# Patient Record
Sex: MALE | Race: WHITE | NOT HISPANIC OR LATINO | Employment: OTHER | URBAN - METROPOLITAN AREA
[De-identification: names, ages, dates, MRNs, and addresses within clinical notes are randomized per-mention and may not be internally consistent; named-entity substitution may affect disease eponyms.]

---

## 2017-06-06 ENCOUNTER — TELEPHONE (OUTPATIENT)
Dept: INTERNAL MEDICINE | Facility: CLINIC | Age: 47
End: 2017-06-06

## 2017-06-06 DIAGNOSIS — Z00.00 ANNUAL PHYSICAL EXAM: Primary | ICD-10-CM

## 2017-06-06 NOTE — TELEPHONE ENCOUNTER
----- Message from Bowen Wheeler sent at 6/6/2017 12:17 PM CDT -----  Contact: Self [phone]  Doctor appointment and lab have been scheduled.  Please link lab orders to the lab appointment.  Date of doctor appointment:    Physical or EP:  06/30  Date of lab appointment:  06/23  Comments:

## 2017-06-23 ENCOUNTER — LAB VISIT (OUTPATIENT)
Dept: LAB | Facility: HOSPITAL | Age: 47
End: 2017-06-23
Attending: FAMILY MEDICINE
Payer: COMMERCIAL

## 2017-06-23 DIAGNOSIS — Z00.00 ANNUAL PHYSICAL EXAM: ICD-10-CM

## 2017-06-23 LAB
ALBUMIN SERPL BCP-MCNC: 3.8 G/DL
ALP SERPL-CCNC: 82 U/L
ALT SERPL W/O P-5'-P-CCNC: 10 U/L
ANION GAP SERPL CALC-SCNC: 7 MMOL/L
AST SERPL-CCNC: 15 U/L
BILIRUB SERPL-MCNC: 1.3 MG/DL
BUN SERPL-MCNC: 13 MG/DL
CALCIUM SERPL-MCNC: 9 MG/DL
CHLORIDE SERPL-SCNC: 108 MMOL/L
CHOLEST/HDLC SERPL: 4.8 {RATIO}
CO2 SERPL-SCNC: 26 MMOL/L
CREAT SERPL-MCNC: 0.9 MG/DL
ERYTHROCYTE [DISTWIDTH] IN BLOOD BY AUTOMATED COUNT: 12.7 %
EST. GFR  (AFRICAN AMERICAN): >60 ML/MIN/1.73 M^2
EST. GFR  (NON AFRICAN AMERICAN): >60 ML/MIN/1.73 M^2
GLUCOSE SERPL-MCNC: 98 MG/DL
HCT VFR BLD AUTO: 43.6 %
HDL/CHOLESTEROL RATIO: 21 %
HDLC SERPL-MCNC: 143 MG/DL
HDLC SERPL-MCNC: 30 MG/DL
HGB BLD-MCNC: 15 G/DL
LDLC SERPL CALC-MCNC: 90.8 MG/DL
MCH RBC QN AUTO: 29.1 PG
MCHC RBC AUTO-ENTMCNC: 34.4 %
MCV RBC AUTO: 85 FL
NONHDLC SERPL-MCNC: 113 MG/DL
PLATELET # BLD AUTO: 148 K/UL
PMV BLD AUTO: 12.3 FL
POTASSIUM SERPL-SCNC: 4 MMOL/L
PROT SERPL-MCNC: 7 G/DL
RBC # BLD AUTO: 5.15 M/UL
SODIUM SERPL-SCNC: 141 MMOL/L
TRIGL SERPL-MCNC: 111 MG/DL
WBC # BLD AUTO: 4.5 K/UL

## 2017-06-23 PROCEDURE — 80053 COMPREHEN METABOLIC PANEL: CPT

## 2017-06-23 PROCEDURE — 85027 COMPLETE CBC AUTOMATED: CPT

## 2017-06-23 PROCEDURE — 80061 LIPID PANEL: CPT

## 2017-06-23 PROCEDURE — 36415 COLL VENOUS BLD VENIPUNCTURE: CPT

## 2017-06-30 ENCOUNTER — OFFICE VISIT (OUTPATIENT)
Dept: INTERNAL MEDICINE | Facility: CLINIC | Age: 47
End: 2017-06-30
Attending: FAMILY MEDICINE
Payer: COMMERCIAL

## 2017-06-30 VITALS
HEART RATE: 75 BPM | WEIGHT: 184.06 LBS | SYSTOLIC BLOOD PRESSURE: 90 MMHG | OXYGEN SATURATION: 98 % | HEIGHT: 70 IN | DIASTOLIC BLOOD PRESSURE: 60 MMHG | BODY MASS INDEX: 26.35 KG/M2

## 2017-06-30 DIAGNOSIS — N20.0 KIDNEY STONE: ICD-10-CM

## 2017-06-30 DIAGNOSIS — Z00.00 ANNUAL PHYSICAL EXAM: Primary | ICD-10-CM

## 2017-06-30 DIAGNOSIS — D69.6 THROMBOCYTOPENIA: ICD-10-CM

## 2017-06-30 PROCEDURE — 99396 PREV VISIT EST AGE 40-64: CPT | Mod: S$GLB,,, | Performed by: FAMILY MEDICINE

## 2017-06-30 PROCEDURE — 99999 PR PBB SHADOW E&M-EST. PATIENT-LVL III: CPT | Mod: PBBFAC,,, | Performed by: FAMILY MEDICINE

## 2017-06-30 NOTE — PROGRESS NOTES
Subjective:       Patient ID: Ja Hudson is a 47 y.o. male.    Chief Complaint: Annual Exam    Established patient for an annual wellness check/physical exam. No specific complaints, please see ROS.        Review of Systems   Constitutional: Negative for chills, fatigue and fever.   HENT: Negative for congestion and trouble swallowing.    Eyes: Negative for redness.   Respiratory: Negative for cough, chest tightness and shortness of breath.    Cardiovascular: Negative for chest pain, palpitations and leg swelling.   Gastrointestinal: Negative for abdominal pain and blood in stool.   Genitourinary: Negative for hematuria.   Musculoskeletal: Negative for arthralgias, back pain, gait problem, joint swelling, myalgias and neck pain.   Skin: Negative for color change and rash.   Neurological: Negative for tremors, speech difficulty, weakness, numbness and headaches.   Hematological: Negative for adenopathy. Does not bruise/bleed easily.   Psychiatric/Behavioral: Negative for behavioral problems, confusion and sleep disturbance. The patient is not nervous/anxious.        Objective:      Physical Exam   Constitutional: He is oriented to person, place, and time. He appears well-developed and well-nourished. No distress.   HENT:   Head: Normocephalic.   Right Ear: Tympanic membrane, external ear and ear canal normal.   Left Ear: Tympanic membrane, external ear and ear canal normal.   Nose: Nose normal.   Mouth/Throat: Uvula is midline, oropharynx is clear and moist and mucous membranes are normal. No oropharyngeal exudate.   Eyes: Conjunctivae are normal. Pupils are equal, round, and reactive to light. Right eye exhibits no discharge. Left eye exhibits no discharge. No scleral icterus.   Neck: Normal range of motion. Neck supple. Carotid bruit is not present. No thyromegaly present.   Cardiovascular: Normal rate, regular rhythm, normal heart sounds and intact distal pulses.  Exam reveals no gallop and no friction  rub.    No murmur heard.  Pulmonary/Chest: Effort normal and breath sounds normal. No respiratory distress. He has no wheezes. He has no rales. He exhibits no tenderness.   Abdominal: Soft. Bowel sounds are normal. He exhibits no distension and no mass. There is no tenderness. There is no rebound and no guarding.   Musculoskeletal: Normal range of motion. He exhibits no edema or tenderness.   Lymphadenopathy:     He has no cervical adenopathy.   Neurological: He is alert and oriented to person, place, and time. He has normal strength. He displays normal reflexes. No cranial nerve deficit or sensory deficit. Coordination and gait normal.   Skin: Skin is warm and dry. No rash noted. He is not diaphoretic.   Psychiatric: He has a normal mood and affect. His behavior is normal. Judgment and thought content normal.   Nursing note and vitals reviewed.      Assessment:       1. Annual physical exam    2. Thrombocytopenia    3. Kidney stone        Plan:   Ja was seen today for annual exam.    Diagnoses and all orders for this visit:    Annual physical exam    Thrombocytopenia  Comments:  very mild, had CT in ER recently, no HSM noted, no hx of HO disease    Kidney stone  Comments:  no sx at this time  Orders:  -     Ambulatory referral to Urology      See meds, orders, follow up, routing and instructions sections of encounter.  Dictation #1  MRN:[id number]  CSN:[id number]

## 2018-10-19 DIAGNOSIS — S52.501D CLOSED FRACTURE OF LOWER END OF RIGHT RADIUS WITH ROUTINE HEALING: Primary | ICD-10-CM

## 2018-10-23 ENCOUNTER — CLINICAL SUPPORT (OUTPATIENT)
Dept: REHABILITATION | Facility: HOSPITAL | Age: 48
End: 2018-10-23
Payer: COMMERCIAL

## 2018-10-23 DIAGNOSIS — R53.1 WEAKNESS: ICD-10-CM

## 2018-10-23 DIAGNOSIS — M25.532 WRIST PAIN, ACUTE, LEFT: ICD-10-CM

## 2018-10-23 PROCEDURE — 97022 WHIRLPOOL THERAPY: CPT | Mod: PO

## 2018-10-23 PROCEDURE — 97165 OT EVAL LOW COMPLEX 30 MIN: CPT | Mod: PO

## 2018-10-23 NOTE — PROGRESS NOTES
[facility] Occupational Therapy  Initial Evaluation     Evaluation Date: 10/23/2018  Patient: Ja Hudson  YOB: 1970  Chart Number: [id number]  Referring Physician: Jeff Foster MD  Medical Diagnosis: S52.501D (ICD-10-CM) - Closed fracture of lower end of right radius with routine healing  Therapy Diagnosis:   1. Wrist pain, acute, left     2. Weakness         Authorization Period: Oct. 19,2018 to Oct.19,2019  Date of Return to MD:  Thursday; Nov.1, 2018    Visit #: 1 of 1  Time In: 4  Time Out: 5  Total Billable Time:  60     Precautions:  Standard    Subjective     Involved Side: right wrist pain   Dominant Side: left   Date of Onset: August 24, 2018   Mechanism of Injury: FOOSH during a game  History of Current Condition:N/A  Surgical Procedure:  Surgery on August 25, 2018  Previous Therapy: [facility] [place], which odd not help    Patient's Goals for Therapy:  To increase motion to return to normal ADLs and IADLs , decrease pain    Pain:  Functional Pain Scale Rating 0-10:   stiffness/10 with use  0/10 without use   0/10 with sleep     Location: stiffness in wrist  Easing Factors: rest    Functional Limitations/Social History:    Previous functional status includes: Independent with all ADLs.     Current FunctionalStatus   Home/Living environment : lives with their family      Limitation of Functional Status as follows:   ADLs/IADLs:     - Feeding: I    - Bathing: I    - Dressing: I    - Grooming: I    - Driving: I        Occupation:     Working presently: employed  Duties: weights    Past Medical History/Physical Systems Review:   Past Medical History:   Diagnosis Date    Kidney stone      Ja Hudson  has a past surgical history that includes Appendectomy and Knee surgery.    Ja currently has no medications in their medication list.    Review of patient's allergies indicates:   Allergen Reactions    Keflex  [cephalexin] Swelling    Statins-hmg-coa reductase inhibitors Swelling          Objective     Mental status: alert    Observation:   Skin intact    Sensation: Median Nerve Distribution   10/23/2018 10/23/2018    Left Right   Monofilament Testing     Normal 1.65-2.83 Present Present   Diminished Light Touch 3.22-3.61 Present Present   Diminished Protective 3.84-4.31 Present Present   Loss of Protective 4.56-6.65 Present Present   Untestable >6.65 Present Present       Wound Assessment:   Size: 7 cm     Edema: Circumferential measurements: In centimters        AROM: WNL in all digits      Wrist ROM: Right  Active  fisted 10/23/2018   Extension 43   Flexion 36   Radial Deviation 15   Ulnar Deviation 20   Supination 70   Pronation wnl          Strength: (SHIVA Dynamometer in lbs.) Average 3 trials, Position II:     10/23/2018 10/23/2018   Rung 2 Left Right   SHIVA # 1 85.6 # 57.6#       Pinch Strength (Measured in psi)     10/23/2018 10/23/2018    Left Right   Key Pinch 23 psi 15psi   3pt Pinch 18psi 10 psi   2pt Pinch 10 psi 6 psi       Outcome Measure: FOTO    Category: Self Care      Current Score  = 41% impaired, CK = at least 40% but < 60% impaired, limited or restricted  Goal at Discharge Score =  25% impaired CJ = at least 20% but < 40% impaired, limited or restricted    Score interpretation is as follows:   TEST SCORE  Modifier  Impairment Limitation Restriction    0%  CH  0 % impaired, limited or restricted    1-19%  CI  @ least 1% but less than 20% impaired, limited or restricted    20-39%  CJ  @ least 20%<40% impaired, limited or restricted    40-59%  CK  @ least 40%<60% impaired, limited or restricted    60-79%  CL  @ least 60% <80% impaired, limited or restricted    80-99%  CM  @ least 80%<100% impaired limited or restricted    100%  CN  100% impaired, limited or restricted             Treatment     Treatment Time In/out  (separate from total time) : 10 minutes at the end of the hour       Issued HEP  and educated on modality use for pain management (see patient instructions). Pt verbally understood the instructions as they were given and demonstrated proper form and technique during therapy. Pt was advise to perform these exercises free of pain, and to stop performing them if pain occurs.    Patient received fluidotherapy to right  hand(s) for 12 minutes to increase blood flow, circulation, desensitization, sensory re-education and for pain management x 2 minutes for each wrist motions    Applied scar pad to hypertrophic scar post scar massage with stick.   See PI in note section for detail HEP    Patient/Family Education: role of OT, goals for OT, scheduling/cancellations - pt verbalized understanding. Discussed insurance limitations with patient.      Assessment       Anticipated barriers to occupational therapy:   Pt has no cultural, educational or language barriers to learning provided.    Profile and History Assessment of Occupational Performance Level of Clinical Decision Making Complexity Score   Occupational Profile:   Ja Hudson is a 48 y.o. male who lives with their family and is currently employed as  . Ja Hudson has difficulty with  working out   affecting his/her daily functional abilities. His/her main goal for therapy is to increase motion .     Comorbidities:   n/a    Medical and Therapy History Review:   Brief               Performance Deficits    Physical:  Joint Mobility  Joint Stability  Muscle Endurance  Edema   Strength  Pinch Strength  Pain    Cognitive:  No Deficits    Psychosocial:    No Deficits     Clinical Decision Making:  high    Assessment Process:  Comprehensive Assessments    Modification/Need for Assistance:  Significant Modifications/Assistance    Intervention Selection:  Multiple Treatment Options       low  Based on PMHX, co morbidities , data from assessments and functional level of assistance required with task and clinical  presentation directly impacting function.     Assessment  This 48 y.o. male referred to Outpatient Occupational Therapy with diagnosis of   Encounter Diagnoses   Name Primary?    Wrist pain, acute, left     Weakness     presents with limitations as described in problem list. Patient can benefit from Occupational Therapy services for Ultrasound, moist heat, PROM, AAROM, AROM, Theraputic exercises, home exercise program provied with written instructions and strengthening. The following goals were discussed with the patient and he is in agreement with them as to be addressed in the treatment plan.     Problem List:   Decreased function of Right UE, Decreased ROM, Increased pain, Decreased strength, Hypomobility, Inability to perform work/tasks and Inability to perform leisure activiites      Goals:     Short Term (6-8  weeks ) :  Goals to be met in 6-8  weeks:    1) Patient to be IND with HEP and modalities for pain managment.  2) Patient will  Increase AROM 15-20 degrees in hand/wrist to increase functional hand use for ADLs/work/leisure activities.  3)Pt will increase  strength 10-20 lbs. to improve functional grasp for ADLs/work/leisure activities.  4) Pt will increase pinch strength in all 3 limited positions by 1-3 psi to assist with manipulation and fine motor task        Plan     Plan  Ja to be treated by Occupational Therapy 1-2  times per week for 60 days during the certification period from   10-23-18  to 12-23-18  to achieve the established goals.         Laura Rubio, GINA,  OTR/L, CHT  Occupational therapist, Certified Hand Therapist

## 2018-10-23 NOTE — PATIENT INSTRUCTIONS
AROM: Wrist Extension        With right palm down, bend wrist up.  Repeat __15__ times per set.  Do __5__ sessions per day.    Copyright © [employer]. All rights reserved.   AROM: Wrist Flexion        With right palm up, bend wrist up.  Repeat __15__ times per set. Do _5___ sessions per day.    Copyright © [employer]. All rights reserved.   AROM: Wrist Radial / Ulnar Deviation Against Gravity        With thumb toward face, gently bend left wrist toward body, then away. Keep elbow bent and supported.  Repeat __15__ times per set.  Do __5__ sessions per day.  Copyright © [employer]. All rights reserved.     Forearm Supinators        Child sits with back straight, left elbow against side and bent at 90°, thumb up.  Resist while child attempts to turn palm up.  Do not let elbow straighten or move away from body.  Hold __3__ seconds. Repeat __15__ times. Do __5__ sessions per day.  CAUTION: Pressure should be slow and steady.    Copyright © [employer]. All rights reserved.

## 2018-11-02 ENCOUNTER — CLINICAL SUPPORT (OUTPATIENT)
Dept: REHABILITATION | Facility: HOSPITAL | Age: 48
End: 2018-11-02
Payer: COMMERCIAL

## 2018-11-02 DIAGNOSIS — M25.532 WRIST PAIN, ACUTE, LEFT: ICD-10-CM

## 2018-11-02 DIAGNOSIS — R53.1 WEAKNESS: ICD-10-CM

## 2018-11-02 PROCEDURE — 97022 WHIRLPOOL THERAPY: CPT | Mod: PO

## 2018-11-02 PROCEDURE — 97018 PARAFFIN BATH THERAPY: CPT | Mod: PO

## 2018-11-02 PROCEDURE — 97110 THERAPEUTIC EXERCISES: CPT | Mod: PO

## 2018-11-02 NOTE — PROGRESS NOTES
Occupational Therapy Daily Treatment Note     Name: Ja Aguilar [facility]  Clinic Number: [id number]    Therapy Diagnosis:   Encounter Diagnoses   Name Primary?    Wrist pain, acute, left     Weakness      Physician: Jeff Foster MD      Surgical Procedure:  Surgery on August 25, 2018  Visit # / Visits authorized:  2 of 20   Date of Return to MD:  Nov.29, Thursday after Thanksgiving       Time In: 1  Time Out: 2    Total Billable Time: 60  minutes    Precautions: Standard    Subjective     Pt reports: he was compliant with home exercise program given last session.   Response to previous treatment: has been compliant  with HEP and using scar pad      Pain: 0/10  Location: stiffness in wrist     Objective     Sarbjit received the following supervised modalities after being cleared for contradictions for 8 minutes:   -  Patient received paraffin bath to left  hand(s) for 8 minutes to increase blood flow, circulation, pain management and for tissue elasticity prior to therex.     Sarbjit received the following manual therapy techniques for 8  minutes:     -Performed scar massage to right volar wrist  area for 8 minutes with hard stick  to decrease adhesions and improve tensile glide.      Sarbjit participated in dynamic functional therapeutic exercises to improve functional performance for 30  Minutes. Patient received fluidotherapy to right  hand(s) for 80 minutes to increase blood flow, circulation, desensitization, sensory re-education and for pain management for 2 minutes with each exercises.   - wrist PRE with 1#wt   - yellow power web with 3 minutes  for finger extension  - gripping soft ball   -blue  pinch with lateral and 3 jaw   - sup with wheel with yellow putty     Active  fisted 10/23/2018   Extension 55(+12)   Flexion 47 (+13)   Radial Deviation 15   Ulnar Deviation 25 (+5)   Supination 81 (+11)   Pronation wnl           Home Exercises and Education Provided       Written Home Exercises  Provided: to add 1#wt with HEP. Supplied red  Putty .   Exercises were reviewed and Sarbjit was able to demonstrate them prior to the end of the session.  Sarbjit demonstrated good  understanding of the education provided.     Pt received a written copy of exercises to perform at home.   See EMR under patient instructions for exercises given.     Sarbjit demonstrated good  understanding of the education provided.       Assessment     Pt would continue to benefit from skilled OT. Pt reports feeling a pinch with supination between  Distal radius and ulnar ( ONESIMO joint). Pt reports healing fracture and doctor reported using not more than 1-2#wt . HEP to upgrade to 1 pound weight at home . Supplied red putty for every other day for gripping and pinching.       Sarbjit is progressing well towards his goals and there are no updates to goals at this time. Pt prognosis is Good.    Pt continues to be limited in functional and leisurely pursuits. Pain limits patients participation in ADL's. Pt is not able to carryout necessary vocational tasks. Patient continues to requires cues and skilled supervision to complete HEP     Pt will continue to benefit from skilled outpatient occupational therapy to address the deficits listed in the problem list on initial evaluation provide pt/family education and to maximize pt's level of independence in the home and community environment.     Anticipated barriers to occupational therapy: n/a    Pt's spiritual, cultural and educational needs considered and pt agreeable to plan of care and goals.    Goals:  Cont goals from POC    Plan   Continue 1-2x week x 6-8  weeks during the certification period from 10-23-18  to 12-23-18  in pursuit of OT goals.    Discussed Plan of Care with patient: Yes  Updates/Grading for next session: added 1#wt and red putty       Laura Rubio, OT , CHT

## 2018-11-08 ENCOUNTER — CLINICAL SUPPORT (OUTPATIENT)
Dept: REHABILITATION | Facility: HOSPITAL | Age: 48
End: 2018-11-08
Payer: COMMERCIAL

## 2018-11-08 DIAGNOSIS — M25.532 WRIST PAIN, ACUTE, LEFT: ICD-10-CM

## 2018-11-08 DIAGNOSIS — R53.1 WEAKNESS: ICD-10-CM

## 2018-11-08 PROCEDURE — 97110 THERAPEUTIC EXERCISES: CPT | Mod: PO

## 2018-11-08 PROCEDURE — 97022 WHIRLPOOL THERAPY: CPT | Mod: PO,59

## 2018-11-08 PROCEDURE — 97018 PARAFFIN BATH THERAPY: CPT | Mod: PO

## 2018-11-08 NOTE — PROGRESS NOTES
Occupational Therapy Daily Treatment Note     Name: Ja Aguilar [facility]  Clinic Number: [id number]    Therapy Diagnosis:   Encounter Diagnoses   Name Primary?    Wrist pain, acute, left     Weakness      Physician: Jeff Foster MD      Surgical Procedure:  Surgery on August 25, 2018  Visit # / Visits authorized:  3 of 20   Date of Return to MD:  Nov.29, Thursday after Thanksgiving       Time In: 1  Time Out: 2    Total Billable Time: 60  minutes    Precautions: Standard    Subjective     Pt reports: he was compliant with home exercise program given last session.   Response to previous treatment: has been compliant  with HEP and using scar pad      Pain: 0/10  Location: stiffness in wrist     Objective     Sarbjit received the following supervised modalities after being cleared for contradictions for 8 minutes:   -  Patient received paraffin bath to left  hand(s) for 8 minutes to increase blood flow, circulation, pain management and for tissue elasticity prior to therex.     Sarbjit received the following manual therapy techniques for 8  minutes:     -Performed scar massage to right volar wrist  area for 8 minutes with hard stick  to decrease adhesions and improve tensile glide.      Sarbjit participated in dynamic functional therapeutic exercises to improve functional performance for 30  Minutes. Patient received fluidotherapy to right  hand(s) for 80 minutes to increase blood flow, circulation, desensitization, sensory re-education and for pain management for 2 minutes with each exercises.   - wrist PRE with 1#wt   - yellow power web with 3 minutes  for finger extension  - gripping soft ball   -blue  pinch with lateral and 3 jaw   - sup with wheel with yellow putty   -  Putty ciser #5 with yellow putty for UD    Active  fisted 11/8/2018   Extension 60    Flexion 49   Radial Deviation 15   Ulnar Deviation 26   Supination 77   Pronation wnl           Home Exercises and Education Provided        Written Home Exercises Provided: to add 1#wt with HEP. Supplied red  Putty .   Exercises were reviewed and Sarbjit was able to demonstrate them prior to the end of the session.  Sarbjit demonstrated good  understanding of the education provided.     Pt received a written copy of exercises to perform at home.   See EMR under patient instructions for exercises given.     Sarbjit demonstrated good  understanding of the education provided.       Assessment     Pt would continue to benefit from skilled OT. Pt reports feeling a pinch with supination between  Distal radius and ulnar ( ONESIMO joint). Pt reports healing fracture and doctor reported using not more than 1-2#wt . HEP to upgrade to 1 pound weight at home . Supplied red putty for every other day for gripping and pinching.       Sarbjit is progressing well towards his goals and there are no updates to goals at this time. Pt prognosis is Good.    Pt continues to be limited in functional and leisurely pursuits. Pain limits patients participation in ADL's. Pt is not able to carryout necessary vocational tasks. Patient continues to requires cues and skilled supervision to complete HEP     Pt will continue to benefit from skilled outpatient occupational therapy to address the deficits listed in the problem list on initial evaluation provide pt/family education and to maximize pt's level of independence in the home and community environment.     Anticipated barriers to occupational therapy: n/a    Pt's spiritual, cultural and educational needs considered and pt agreeable to plan of care and goals.    Goals:  Cont goals from POC    Plan   Continue 1-2x week x 6-8  weeks during the certification period from 10-23-18  to 12-23-18  in pursuit of OT goals.    Discussed Plan of Care with patient: Yes  Updates/Grading for next session: added 1#wt and red putty       Laura Rubio, OT , CHT

## 2018-11-16 ENCOUNTER — CLINICAL SUPPORT (OUTPATIENT)
Dept: REHABILITATION | Facility: HOSPITAL | Age: 48
End: 2018-11-16
Payer: COMMERCIAL

## 2018-11-16 DIAGNOSIS — R53.1 WEAKNESS: ICD-10-CM

## 2018-11-16 DIAGNOSIS — M25.532 WRIST PAIN, ACUTE, LEFT: ICD-10-CM

## 2018-11-16 PROCEDURE — 97110 THERAPEUTIC EXERCISES: CPT | Mod: PO

## 2018-11-16 PROCEDURE — 97018 PARAFFIN BATH THERAPY: CPT | Mod: PO

## 2018-11-16 PROCEDURE — 97022 WHIRLPOOL THERAPY: CPT | Mod: PO,59

## 2018-11-16 NOTE — PROGRESS NOTES
Occupational Therapy Daily Treatment Note     Name: Ja Aguilar [facility]  Clinic Number: [id number]    Therapy Diagnosis:   Encounter Diagnoses   Name Primary?    Wrist pain, acute, left     Weakness      Physician: Jeff Foster MD      Surgical Procedure:  Surgery on August 25, 2018  Visit # / Visits authorized:  4 of 20   Date of Return to MD:  Nov.29, Thursday after Thanksgiving       Time In: 1  Time Out: 2    Total Billable Time: 60  minutes    Precautions: Standard    Subjective     Pt reports: he was compliant with home exercise program given last session.   Response to previous treatment: has been compliant  with HEP and using scar pad      Pain: 0/10  Location: stiffness in wrist     Objective     Sarbjit received the following supervised modalities after being cleared for contradictions for 8 minutes:   -  Patient received paraffin bath to left  hand(s) for 8 minutes to increase blood flow, circulation, pain management and for tissue elasticity prior to therex.     Sarbjit received the following manual therapy techniques for 8  minutes:     -Performed scar massage to right volar wrist  area for 8 minutes with hard stick  to decrease adhesions and improve tensile glide.      Sarbjit participated in dynamic functional therapeutic exercises to improve functional performance for 30  Minutes. Patient received fluidotherapy to right  hand(s) for 80 minutes to increase blood flow, circulation, desensitization, sensory re-education and for pain management for 2 minutes with each exercises.   - wrist PRE with 2#wt   - yellow power web with 3 minutes  for finger extension  -  CHG with sup/pro   -blue  pinch with lateral and 3 jaw   - sup with wheel with yellow putty   -  Putty ciser #5 with yellow putty for UD    Active  fisted 11/8/2018   Extension 60    Flexion 49   Radial Deviation 15   Ulnar Deviation 26   Supination 77   Pronation wnl           Home Exercises and Education Provided        Written Home Exercises Provided: to add 1#wt with HEP. Supplied red  Putty .   Exercises were reviewed and Sarbjit was able to demonstrate them prior to the end of the session.  Sarbjit demonstrated good  understanding of the education provided.     Pt received a written copy of exercises to perform at home.   See EMR under patient instructions for exercises given.     Sarbjit demonstrated good  understanding of the education provided.       Assessment     Pt would continue to benefit from skilled OT. Pt tolerated 2#wt and started with 2# wt recently. Motion continues to improve.       Sarbjit is progressing well towards his goals and there are no updates to goals at this time. Pt prognosis is Good.    Pt continues to be limited in functional and leisurely pursuits. Pain limits patients participation in ADL's. Pt is not able to carryout necessary vocational tasks. Patient continues to requires cues and skilled supervision to complete HEP     Pt will continue to benefit from skilled outpatient occupational therapy to address the deficits listed in the problem list on initial evaluation provide pt/family education and to maximize pt's level of independence in the home and community environment.     Anticipated barriers to occupational therapy: n/a    Pt's spiritual, cultural and educational needs considered and pt agreeable to plan of care and goals.    Goals:  Cont goals from POC    Plan   Continue 1-2x week x 6-8  weeks during the certification period from 10-23-18  to 12-23-18  in pursuit of OT goals.    Discussed Plan of Care with patient: Yes  Updates/Grading for next session: added 1#wt and red putty       Laura Rubio, OT , CHT

## 2018-11-23 ENCOUNTER — CLINICAL SUPPORT (OUTPATIENT)
Dept: REHABILITATION | Facility: HOSPITAL | Age: 48
End: 2018-11-23
Payer: COMMERCIAL

## 2018-11-23 DIAGNOSIS — R53.1 WEAKNESS: ICD-10-CM

## 2018-11-23 DIAGNOSIS — M25.532 WRIST PAIN, ACUTE, LEFT: ICD-10-CM

## 2018-11-23 PROCEDURE — 97022 WHIRLPOOL THERAPY: CPT | Mod: PO

## 2018-11-23 PROCEDURE — 97110 THERAPEUTIC EXERCISES: CPT | Mod: PO

## 2018-11-23 NOTE — PROGRESS NOTES
Occupational Therapy Daily Treatment Note     Name: [facility] Aguilar [facility]  Clinic Number: [id number]    Therapy Diagnosis:   Encounter Diagnoses   Name Primary?    Wrist pain, acute, left     Weakness      Physician: Jeff Foster MD      Surgical Procedure:  Surgery on August 25, 2018  Visit # / Visits authorized:  5 of 20   Date of Return to MD:  Nov.29, Thursday after Thanksgiving       Time In: 1  Time Out: 2    Total Billable Time: 60  minutes    Precautions: Standard    Subjective     Pt reports: he was compliant with home exercise program given last session.   Response to previous treatment: has been compliant  with HEP and using scar pad      Pain: 0/10  Location: stiffness in wrist     Objective     Sarbjit received the following supervised modalities after being cleared for contradictions for 8 minutes:   -  Patient received MH x 9 minutes ( LLPS for wrist flexion)  For increase motion     Sarbjit received the following manual therapy techniques for 8  minutes:     -Performed scar massage to right volar wrist  area for 8 minutes with hard stick  to decrease adhesions and improve tensile glide.      Sarbjit participated in dynamic functional therapeutic exercises to improve functional performance for 30  Minutes. Patient received fluidotherapy to right  hand(s) for 80 minutes to increase blood flow, circulation, desensitization, sensory re-education and for pain management for 2 minutes with each exercises.   - wrist PRE with 2#wt   - yellow power web with 3 minutes  for finger extension  -  CHG with sup/pro   -blue  pinch with lateral and 3 jaw   - sup with wheel with yellow putty   -  Putty ciser #5 with yellow putty for UD    TODAY  Active  fisted 11/23/2018   Extension 62 ( +19)   Flexion 70 (+34)   Radial Deviation 15 (WNL)   Ulnar Deviation 33 (+13)   Supination 85 (+15)   Pronation wnl            Strength: (SHIVA Dynamometer in lbs.) Average 3 trials, Position II:     10/23/2018  10/23/2018   Rung 2 Left Right   SHIVA # 1 87 # 65# (+8)       Pinch Strength (Measured in psi)     10/23/2018 10/23/2018    Left Right   Key Pinch 23 psi 20 psi (+5)   3pt Pinch 18psi 12 psi(+2)   2pt Pinch 10 psi 7.5 psi(+1.5)     Home Exercises and Education Provided       Written Home Exercises Provided: to add 1#wt with HEP. Supplied red  Putty .   Exercises were reviewed and Sarbjit was able to demonstrate them prior to the end of the session.  Sarbjit demonstrated good  understanding of the education provided.     Pt received a written copy of exercises to perform at home.   See EMR under patient instructions for exercises given.     Sarbjit demonstrated good  understanding of the education provided.       Assessment     Pt would continue to benefit from skilled OT. Pt tolerated 2#wt and started with 2# wt recently. Motion continues to improve.       Sarbjit is progressing well towards his goals and there are no updates to goals at this time. Pt prognosis is Good.    Pt continues to be limited in functional and leisurely pursuits. Pain limits patients participation in ADL's. Pt is not able to carryout necessary vocational tasks. Patient continues to requires cues and skilled supervision to complete HEP     Pt will continue to benefit from skilled outpatient occupational therapy to address the deficits listed in the problem list on initial evaluation provide pt/family education and to maximize pt's level of independence in the home and community environment.     Anticipated barriers to occupational therapy: n/a    Pt's spiritual, cultural and educational needs considered and pt agreeable to plan of care and goals.      Goals:     Short Term (6-8  weeks ) :  Goals to be met in 6-8  weeks:    1) Patient to be IND with HEP and modalities for pain managment.(met)  2) Patient will  Increase AROM 15-20 degrees in hand/wrist to increase functional hand use for ADLs/work/leisure activities.(met)  3)Pt will increase  strength  10-20 lbs. to improve functional grasp for ADLs/work/leisure activities.(met)  4) Pt will increase pinch strength in all 3 limited positions by 1-3 psi to assist with manipulation and fine motor task(met)     Goals to be met in 4   weeks:    1) Patient to be IND with HEP and modalities for pain managment.  2) Patient will  Increase AROM 15-20 degrees in hand/wrist to increase functional hand use for ADLs/work/leisure activities.  3)Pt will increase  strength 10-20 lbs. to improve functional grasp for ADLs/work/leisure activities.   4) Pt will increase pinch strength in all 3 limited positions by 1-3 psi to assist with manipulation and fine motor task      Plan   Continue 1-2x week x 6-8  weeks during the certification period from 10-23-18  to 12-23-18  in pursuit of OT goals.    Discussed Plan of Care with patient: Yes  Updates/Grading for next session: added 1#wt and hernán Rubio, OT , CHT

## 2018-11-29 NOTE — PROGRESS NOTES
Occupational Therapy Daily Treatment Note     Name: Ja Aguilar [facility]  Clinic Number: [id number]    Therapy Diagnosis:   Encounter Diagnoses   Name Primary?    Wrist pain, acute, left     Weakness      Physician: Jeff Foster MD      Surgical Procedure:  Surgery on August 25, 2018  Visit # / Visits authorized:  6 of 20   Date of Return to MD:  Does not have to return as fracture is healed    Precautions are lifted and He is WB as tolerated       Time In: 1  Time Out: 2    Total Billable Time: 60  minutes    Precautions: Standard    Subjective     Pt reports: he precautions are lifted   Response to previous treatment: has been compliant  with HEP and using scar pad      Pain: 0/10  Location: stiffness in wrist     Objective     Sarbjit received the following supervised modalities after being cleared for contradictions for 8 minutes:   -  Patient received MH x 9 minutes ( LLPS for wrist flexion)  For increase motion     Sarbjit received the following manual therapy techniques for 8  minutes:     -Performed scar massage to right volar wrist  area for 8 minutes with hard stick  to decrease adhesions and improve tensile glide.      Sarbjit participated in dynamic functional therapeutic exercises to improve functional performance for 30  Minutes. Patient received fluidotherapy to right  hand(s) for 80 minutes to increase blood flow, circulation, desensitization, sensory re-education and for pain management for 2 minutes with each exercises.     - red flex-bar with supination  - leonel-flex in flexion and extension (1 MIN EACH)  - yellow power web with 3 minutes  for finger extension  -  CHG for FCU strength and UD   -   blue pinch with lateral and 3 jaw   -  Putty ciser #5 with yellow putty for UD  -wrist putty presses with red putty         Active  fisted 11/23/2018   Extension 62 ( +19)   Flexion 70 (+34)   Radial Deviation 15 (WNL)   Ulnar Deviation 33 (+13)   Supination 85 (+15)   Pronation wnl             Strength: (SHIVA Dynamometer in lbs.) Average 3 trials, Position II:     10/23/2018 10/23/2018   Rung 2 Left Right   SHIVA # 1 87 # 65# (+8)       Pinch Strength (Measured in psi)     10/23/2018 10/23/2018    Left Right   Key Pinch 23 psi 20 psi (+5)   3pt Pinch 18psi 12 psi(+2)   2pt Pinch 10 psi 7.5 psi(+1.5)     Home Exercises and Education Provided       Written Home Exercises Provided: to add 2#wt with HEP. Supplied red  Putty .   Exercises were reviewed and Sarbjit was able to demonstrate them prior to the end of the session.  Sarbjit demonstrated good  understanding of the education provided.     Pt received a written copy of exercises to perform at home.   See EMR under patient instructions for exercises given.     Sarjbit demonstrated good  understanding of the education provided.       Assessment     Pt would continue to benefit from skilled OT. Pt tolerated 2#wt and started with 2# wt recently. Motion continues to improve.  Tolerated endurance and strengthening of wrist and elbow today. Prognosis is good and cont to improve.      Sarbjit is progressing well towards his goals and there are no updates to goals at this time. Pt prognosis is Good.    Pt continues to be limited in functional and leisurely pursuits. Pain limits patients participation in ADL's. Pt is not able to carryout necessary vocational tasks. Patient continues to requires cues and skilled supervision to complete HEP     Pt will continue to benefit from skilled outpatient occupational therapy to address the deficits listed in the problem list on initial evaluation provide pt/family education and to maximize pt's level of independence in the home and community environment.     Anticipated barriers to occupational therapy: n/a    Pt's spiritual, cultural and educational needs considered and pt agreeable to plan of care and goals.      Goals:     Short Term (6-8  weeks ) :  Goals to be met in 6-8  weeks:    1) Patient to be IND with HEP and  modalities for pain managment.(met)  2) Patient will  Increase AROM 15-20 degrees in hand/wrist to increase functional hand use for ADLs/work/leisure activities.(met)  3)Pt will increase  strength 10-20 lbs. to improve functional grasp for ADLs/work/leisure activities.(met)  4) Pt will increase pinch strength in all 3 limited positions by 1-3 psi to assist with manipulation and fine motor task(met)     Goals to be met in 4   weeks:    1) Patient to be IND with HEP and modalities for pain managment.  2) Patient will  Increase AROM 15-20 degrees in hand/wrist to increase functional hand use for ADLs/work/leisure activities.  3)Pt will increase  strength 10-20 lbs. to improve functional grasp for ADLs/work/leisure activities.   4) Pt will increase pinch strength in all 3 limited positions by 1-3 psi to assist with manipulation and fine motor task      Plan   Continue 1-2x week x 6-8  weeks during the certification period from 10-23-18  to 12-23-18  in pursuit of OT goals.    Discussed Plan of Care with patient: Yes  Updates/Grading for next session: added 1#wt and hernán Rubio, OT , CHT

## 2018-11-30 ENCOUNTER — CLINICAL SUPPORT (OUTPATIENT)
Dept: REHABILITATION | Facility: HOSPITAL | Age: 48
End: 2018-11-30
Payer: COMMERCIAL

## 2018-11-30 DIAGNOSIS — R53.1 WEAKNESS: ICD-10-CM

## 2018-11-30 DIAGNOSIS — M25.532 WRIST PAIN, ACUTE, LEFT: ICD-10-CM

## 2018-11-30 PROCEDURE — 97110 THERAPEUTIC EXERCISES: CPT | Mod: PO

## 2018-11-30 PROCEDURE — 97018 PARAFFIN BATH THERAPY: CPT | Mod: KX,PO

## 2018-12-07 ENCOUNTER — CLINICAL SUPPORT (OUTPATIENT)
Dept: REHABILITATION | Facility: HOSPITAL | Age: 48
End: 2018-12-07
Payer: COMMERCIAL

## 2018-12-07 DIAGNOSIS — R53.1 WEAKNESS: ICD-10-CM

## 2018-12-07 DIAGNOSIS — M25.532 WRIST PAIN, ACUTE, LEFT: ICD-10-CM

## 2018-12-07 PROCEDURE — 97110 THERAPEUTIC EXERCISES: CPT | Mod: PO

## 2018-12-07 NOTE — PROGRESS NOTES
Occupational Therapy Daily Treatment Note     Name: Ja Aguilar [facility]  Clinic Number: [id number]    Therapy Diagnosis:   Encounter Diagnoses   Name Primary?    Wrist pain, acute, left     Weakness      Physician: Jeff Foster MD      Surgical Procedure:  Surgery on August 25, 2018  Visit # / Visits authorized:  7 of 20   Date of Return to MD:  Does not have to return as fracture is healed    Precautions are lifted and He is WB as tolerated       Time In: 1  Time Out: 2    Total Billable Time: 60  minutes    Precautions: Standard    Subjective     Pt reports: he precautions are lifted   Response to previous treatment: has been compliant  with HEP and using scar pad      Pain: 0/10  Location: stiffness in wrist     Objective     Sarbjit received the following supervised modalities after being cleared for contradictions for 8 minutes:   -  Patient received MH x 9 minutes ( LLPS for wrist flexion)  For increase motion     Sarbjit received the following manual therapy techniques for 8  minutes:     -Performed scar massage to right volar wrist  area for 8 minutes with hard stick  to decrease adhesions and improve tensile glide.      Sarbjit participated in dynamic functional therapeutic exercises to improve functional performance for 30  Minutes. Patient received fluidotherapy to right  hand(s) for 80 minutes to increase blood flow, circulation, desensitization, sensory re-education and for pain management for 2 minutes with each exercises.     - red flex-bar with supination  - leonel-flex in flexion and extension (1 MIN EACH)  - red power web with 3 minutes  for finger extension  -  CHG for FCU strength and UD   -   blue pinch with lateral and 3 jaw   -  Putty ciser #5 with yellow putty for UD  -wrist putty presses with red putty   - circumduction with 3@wt       Active  fisted 11/23/2018   Extension 62 ( +19)   Flexion 70 (+34)   Radial Deviation 15 (WNL)   Ulnar Deviation 33 (+13)   Supination 85  (+15)   Pronation wnl            Strength: (SHIVA Dynamometer in lbs.) Average 3 trials, Position II:     10/23/2018 10/23/2018   Rung 2 Left Right   SHIVA # 1 87 # 65# (+8)       Pinch Strength (Measured in psi)     10/23/2018 10/23/2018    Left Right   Key Pinch 23 psi 20 psi (+5)   3pt Pinch 18psi 12 psi(+2)   2pt Pinch 10 psi 7.5 psi(+1.5)     Home Exercises and Education Provided       Written Home Exercises Provided: to add 2#wt with HEP. Supplied red  Putty .   Exercises were reviewed and Sarbjit was able to demonstrate them prior to the end of the session.  Sarbjit demonstrated good  understanding of the education provided.     Pt received a written copy of exercises to perform at home.   See EMR under patient instructions for exercises given.     Sarbjit demonstrated good  understanding of the education provided.       Assessment     Pt would continue to benefit from skilled OT.       Sarbjit is progressing well towards his goals and there are no updates to goals at this time. Pt prognosis is Good.    Pt continues to be limited in functional and leisurely pursuits. Pain limits patients participation in ADL's. Pt is not able to carryout necessary vocational tasks. Patient continues to requires cues and skilled supervision to complete HEP     Pt will continue to benefit from skilled outpatient occupational therapy to address the deficits listed in the problem list on initial evaluation provide pt/family education and to maximize pt's level of independence in the home and community environment.     Anticipated barriers to occupational therapy: n/a    Pt's spiritual, cultural and educational needs considered and pt agreeable to plan of care and goals.      Goals:     Short Term (6-8  weeks ) :  Goals to be met in 6-8  weeks:    1) Patient to be IND with HEP and modalities for pain managment.(met)  2) Patient will  Increase AROM 15-20 degrees in hand/wrist to increase functional hand use for ADLs/work/leisure  activities.(met)  3)Pt will increase  strength 10-20 lbs. to improve functional grasp for ADLs/work/leisure activities.(met)  4) Pt will increase pinch strength in all 3 limited positions by 1-3 psi to assist with manipulation and fine motor task(met)     Goals to be met in 4   weeks:    1) Patient to be IND with HEP and modalities for pain managment.  2) Patient will  Increase AROM 15-20 degrees in hand/wrist to increase functional hand use for ADLs/work/leisure activities.  3)Pt will increase  strength 10-20 lbs. to improve functional grasp for ADLs/work/leisure activities.   4) Pt will increase pinch strength in all 3 limited positions by 1-3 psi to assist with manipulation and fine motor task      Plan   Continue 1-2x week x 6-8  weeks during the certification period from 10-23-18  to 12-23-18  in pursuit of OT goals.    Discussed Plan of Care with patient: Yes  Updates/Grading for next session: added 1#wt and red aida Rubio, OT , CHT

## 2018-12-14 ENCOUNTER — CLINICAL SUPPORT (OUTPATIENT)
Dept: REHABILITATION | Facility: HOSPITAL | Age: 48
End: 2018-12-14
Payer: COMMERCIAL

## 2018-12-14 DIAGNOSIS — M25.532 WRIST PAIN, ACUTE, LEFT: ICD-10-CM

## 2018-12-14 DIAGNOSIS — R53.1 WEAKNESS: ICD-10-CM

## 2018-12-14 PROCEDURE — 97018 PARAFFIN BATH THERAPY: CPT | Mod: PO

## 2018-12-14 PROCEDURE — 97110 THERAPEUTIC EXERCISES: CPT | Mod: PO

## 2018-12-21 ENCOUNTER — CLINICAL SUPPORT (OUTPATIENT)
Dept: REHABILITATION | Facility: HOSPITAL | Age: 48
End: 2018-12-21
Attending: FAMILY MEDICINE
Payer: COMMERCIAL

## 2018-12-21 DIAGNOSIS — M25.532 WRIST PAIN, ACUTE, LEFT: ICD-10-CM

## 2018-12-21 DIAGNOSIS — R53.1 WEAKNESS: ICD-10-CM

## 2018-12-21 PROCEDURE — 97110 THERAPEUTIC EXERCISES: CPT | Mod: PO

## 2018-12-21 NOTE — PROGRESS NOTES
Occupational Therapy Daily Treatment Note     Name: Ja Aguilar [facility]  Clinic Number: [id number]    Therapy Diagnosis:   Encounter Diagnoses   Name Primary?    Wrist pain, acute, left     Weakness      Physician: Jeff Foster MD      Surgical Procedure:  Surgery on August 25, 2018  Visit # / Visits authorized:  1 of 20   Date of Return to MD:  Does not have to return as fracture is healed    Precautions are lifted and He is WB as tolerated       Time In: 1  Time Out: 2    Total Billable Time: 60  minutes    Precautions: Standard    Subjective     Pt reports: he precautions are lifted   Response to previous treatment: has been compliant  with HEP and using scar pad      Pain: 0/10  Location: stiffness in wrist     Objective     Sarbjit received the following supervised modalities after being cleared for contradictions for 8 minutes:   -  Patient received MH x 9 minutes ( LLPS for wrist flexion)  For increase motion     Sarbjit received the following manual therapy techniques for 8  minutes:     -Performed scar massage to right volar wrist  area for 8 minutes with hard stick  to decrease adhesions and improve tensile glide.      Sarbjit participated in dynamic functional therapeutic exercises to improve functional performance for 30  Minutes. Patient received fluidotherapy to right  hand(s) for 80 minutes to increase blood flow, circulation, desensitization, sensory re-education and for pain management for 2 minutes with each exercises.     - blue flex-bar with supination  - leonel-flex in flexion and extension (1 MIN EACH)  - Digi-extend with blue rubber band for 3 minutes  for finger extension  -  CHG for FCU strength and UD   -   blue pinch with lateral and 3 jaw   -  Putty ciser #5 with red putty for UD  -wrist putty presses with red putty   - circumduction with 3@wt       Home Exercises and Education Provided       Written Home Exercises Provided: to add 3#wt with HEP. Supplied red  Putty .    Exercises were reviewed and Sarbjit was able to demonstrate them prior to the end of the session.  Sarbjit demonstrated good  understanding of the education provided.     Pt received a written copy of exercises to perform at home.   See EMR under patient instructions for exercises given.     Sarbjit demonstrated good  understanding of the education provided.       Assessment     Pt would continue to benefit from skilled OT.       Sarbjit is progressing well towards his goals and there are no updates to goals at this time. Pt prognosis is Good.    Pt continues to be limited in functional and leisurely pursuits. Pain limits patients participation in ADL's. Pt is not able to carryout necessary vocational tasks. Patient continues to requires cues and skilled supervision to complete HEP     Pt will continue to benefit from skilled outpatient occupational therapy to address the deficits listed in the problem list on initial evaluation provide pt/family education and to maximize pt's level of independence in the home and community environment.     Anticipated barriers to occupational therapy: n/a    Pt's spiritual, cultural and educational needs considered and pt agreeable to plan of care and goals.      Goals:     Short Term (6-8  weeks ) :  Goals to be met in 6-8  weeks:    1) Patient to be IND with HEP and modalities for pain managment.(met)  2) Patient will  Increase AROM 15-20 degrees in hand/wrist to increase functional hand use for ADLs/work/leisure activities.(met)  3)Pt will increase  strength 10-20 lbs. to improve functional grasp for ADLs/work/leisure activities.(met)  4) Pt will increase pinch strength in all 3 limited positions by 1-3 psi to assist with manipulation and fine motor task(met)     Goals to be met in 4   weeks:    1) Patient to be IND with HEP and modalities for pain managment.  2) Patient will  Increase AROM 15-20 degrees in hand/wrist to increase functional hand use for ADLs/work/leisure  activities.  3)Pt will increase  strength 10-20 lbs. to improve functional grasp for ADLs/work/leisure activities.   4) Pt will increase pinch strength in all 3 limited positions by 1-3 psi to assist with manipulation and fine motor task      Plan   Continue 1-2x week x 6-8  weeks during the certification period from 10-23-18  to 12-23-18  in pursuit of OT goals.    Discussed Plan of Care with patient: Yes  Updates/Grading for next session: discharge next session      Laura Rubio OT , CHT

## 2019-01-04 ENCOUNTER — CLINICAL SUPPORT (OUTPATIENT)
Dept: REHABILITATION | Facility: HOSPITAL | Age: 49
End: 2019-01-04
Payer: COMMERCIAL

## 2019-01-04 DIAGNOSIS — R53.1 WEAKNESS: ICD-10-CM

## 2019-01-04 DIAGNOSIS — M25.532 WRIST PAIN, ACUTE, LEFT: ICD-10-CM

## 2019-01-04 PROCEDURE — 97110 THERAPEUTIC EXERCISES: CPT | Mod: PO

## 2019-01-04 PROCEDURE — 97140 MANUAL THERAPY 1/> REGIONS: CPT | Mod: PO

## 2019-01-04 NOTE — PROGRESS NOTES
"                          Occupational Therapy Daily Treatment Note     Name: Ja Aguilar [facility]  Clinic Number: [id number]  Therapy Diagnosis:   Encounter Diagnoses   Name Primary?    Wrist pain, acute, left     Weakness      Physician: Jeff Foster MD     Surgical Procedure:  Surgery on August 25, 2018  Visit # / Visits authorized:  9 of 20   Date of Return to MD:  Does not have to return as fracture is healed    Precautions are lifted and He is WB as tolerated     Time In: 1:00 pm   Time Out: 2:00 pm     Total Billable Time: 60 minutes    Precautions: Standard    Subjective     Pt reports: "I just can't bend it forwards very well yet."    His precautions are lifted.   Response to previous treatment: has been compliant with HEP and using scar pad.       Pain: 0/10  Location: stiffness in wrist     Objective     Sarbjit received the following supervised modalities after being cleared for contradictions for 8 minutes:   - LLPS for wrist flexion over wedge with 7.5# wt with paraffin bath to R hand with MHP x 8 minutes to increase motion     Sarbjit received the following manual therapy techniques for 8 minutes:   - Performed scar massage to right volar wrist area for 8 minutes with mini vibrator to decrease adhesions and improve tensile glide.    Sarbjit participated in dynamic functional therapeutic exercises to improve functional performance for 44 minutes.   - Blue flexbar supination and pronation x 20 reps x 2 sets each  - Wrist well with 3# wt x 5 reps (down + up)   - Performed 4# free weight for wrist flexion/extension (2 positions), RD/UD, supination/pronation x 20 reps each   - Patient performed 55# PHG for sustained gripping, moving large pegs across pegboard x 3 trials  - reviewed HEP and exercises to perform in gym     Home Exercises and Education Provided       Written Home Exercises Provided: No new.  Continue HEP as instructed in previous tx sessions.   Exercises were reviewed and Sarbjit was able to " demonstrate them prior to the end of the session.  Sarbjit demonstrated good  understanding of the education provided.     Pt received a written copy of exercises to perform at home.   See EMR under patient instructions for exercises given 10/23/18.     Sarbjit demonstrated good  understanding of the education provided.       Assessment   Sarbjit tolerated treatment session well and without any problems or pain.  OT continues to progress in wrist and hand strengthening with patient demonstrating good performance and participation.  He continues to complain of decreased wrist flexion, but flexion range of motion is steadily improving and appears to be approaching normal limits at this time.   Pt would continue to benefit from skilled OT.   Sarbjit is progressing well towards his goals and there are no updates to goals at this time. Pt prognosis is Good.  He has met nearly all of his occupational therapy goals at this time period and has increased participation in functional, leisurely, and occupational pursuits.  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  OT will continue with skilled services to maximize functional performance for return to occupational duties and to ensure competence and compliance with his HEP prior to discharge.      Anticipated barriers to occupational therapy: n/a    Pt's spiritual, cultural and educational needs considered and pt agreeable to plan of care and goals.      Goals:     Short Term (6-8  weeks ) :  Goals to be met in 6-8  weeks:    1) Patient to be IND with HEP and modalities for pain managment.(met)  2) Patient will  Increase AROM 15-20 degrees in hand/wrist to increase functional hand use for ADLs/work/leisure activities.(met)  3)Pt will increase  strength 10-20 lbs. to improve functional grasp for ADLs/work/leisure activities.(met)  4) Pt will increase pinch strength in all 3 limited positions by 1-3 psi to assist with manipulation and fine motor task(met)     Goals to be met in 4  weeks:    1) Patient to be IND with HEP and modalities for pain managment.  2) Patient will  Increase AROM 15-20 degrees in hand/wrist to increase functional hand use for ADLs/work/leisure activities.  3)Pt will increase  strength 10-20 lbs. to improve functional grasp for ADLs/work/leisure activities.   4) Pt will increase pinch strength in all 3 limited positions by 1-3 psi to assist with manipulation and fine motor task      Plan   Continue established POC.     Discussed Plan of Care with patient: Yes  Updates/Grading for next session: discharge next session

## 2019-01-11 ENCOUNTER — CLINICAL SUPPORT (OUTPATIENT)
Dept: REHABILITATION | Facility: HOSPITAL | Age: 49
End: 2019-01-11
Payer: COMMERCIAL

## 2019-01-11 DIAGNOSIS — R53.1 WEAKNESS: ICD-10-CM

## 2019-01-11 DIAGNOSIS — M25.532 WRIST PAIN, ACUTE, LEFT: ICD-10-CM

## 2019-01-11 PROCEDURE — 97140 MANUAL THERAPY 1/> REGIONS: CPT | Mod: PO

## 2019-01-11 PROCEDURE — 97110 THERAPEUTIC EXERCISES: CPT | Mod: PO

## 2019-01-11 NOTE — PROGRESS NOTES
Occupational Therapy Daily Discharge Note     Name: Ja Aguilar [facility]  Clinic Number: [id number]  Therapy Diagnosis:   Encounter Diagnoses   Name Primary?    Wrist pain, acute, left     Weakness      Physician: Jeff Foster MD     Surgical Procedure:  Surgery on August 25, 2018  Visit # / Visits authorized:  10 of 20   Date of Return to MD:  Does not have to return as fracture is healed    Precautions are lifted and He is WB as tolerated     Time In: 1:05 pm   Time Out: 1:45 pm     Total Billable Time: 40 minutes    Precautions: Standard    Subjective     Pt reports: No new complaints.  Patient reports compliance with his HEP.  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  He has returned to personal training without any problems.     His precautions are lifted.   Response to previous treatment: has been compliant with HEP and using scar pad.       Pain: 0/10  Location: R wrist     Objective     Progress as follows:     Active Range of Motion   fisted 12/7/18 1/11/18    Extension 62 ( +19) 65   Flexion 70 (+34) 70    Radial Deviation 15 (WNL) 25   Ulnar Deviation 33 (+13) 35   Supination 85 (+15) 75   Pronation wnl 85          Strength: (SHIVA Dynamometer in lbs.) Average 3 trials, Position II:       10/23/18 12/7/18  1/11/18    Rung 2 Left Right Right    SHIVA # 1 87 # 65# (+8) 65#          Pinch Strength (Measured in psi)       10/23/18 12/7/18  1/11/18      Left Right Right    Key Pinch 23 psi 20 psi (+5) 21   3pt Pinch 18psi 12 psi(+2) 12   2pt Pinch 10 psi 7.5 psi(+1.5) 8        Sarbjit received the following supervised modalities after being cleared for contradictions for 8 minutes:   - LLPS for wrist flexion over wedge with 7.5# wt with paraffin bath to R hand with MHP x 8 minutes to increase motion (TherEX for LLPS)     Sarbjit received the following manual therapy techniques for 8 minutes:   - Performed scar massage to right volar wrist area for 8 minutes with mini  vibrator to decrease adhesions and improve tensile glide.    - Reviewed HEP and modality use for pain management with patient reporting good understanding of same.         Home Exercises and Education Provided       Written Home Exercises Provided: No new.  Continue HEP as instructed in previous tx sessions.   Exercises were reviewed and Sarbjit was able to demonstrate them prior to the end of the session.  Sarbjit demonstrated good  understanding of the education provided.     Pt received a written copy of exercises to perform at home.   See EMR under patient instructions for exercises given 10/23/18.     Sarbjit demonstrated good  understanding of the education provided.       Assessment/Dsicharge Summary    Discharge measurements captured this date with patient demonstrating improvements in R wrist range of motion and strength.  R wrist active range of motion is WNL at this time period as noted above, except for a 5 degree supination limitation.  His  strength has improved by 8 pounds since his initial evaluation and pinch strength has improved between 2-6 psi's since initial eval.  He has progress well throughout his POC and has met nearly all of his LTGs at this time. He reports increased participation in functional, leisurely, and occupational pursuits.  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  HEP was reviewed again this date with patient demonstrating good performance and verbalized understanding of continued compliance in order to progress outside of therapy.  Patient is being discharged from skilled OT intervention and patient is agreeable to D/C.      Goals:     Short Term (6-8  weeks ) :  Goals to be met in 6-8  weeks:    1) Patient to be IND with HEP and modalities for pain managment.(met)  2) Patient will  Increase AROM 15-20 degrees in hand/wrist to increase functional hand use for ADLs/work/leisure activities.(met)  3)Pt will increase  strength 10-20 lbs.  to improve functional grasp for ADLs/work/leisure activities.(nearly met)  4) Pt will increase pinch strength in all 3 limited positions by 1-3 psi to assist with manipulation and fine motor task(met)    Plan   Discharged.

## 2019-07-18 ENCOUNTER — OFFICE VISIT (OUTPATIENT)
Dept: INTERNAL MEDICINE | Facility: CLINIC | Age: 49
End: 2019-07-18
Payer: COMMERCIAL

## 2019-07-18 ENCOUNTER — TELEPHONE (OUTPATIENT)
Dept: INTERNAL MEDICINE | Facility: CLINIC | Age: 49
End: 2019-07-18

## 2019-07-18 VITALS
HEIGHT: 70 IN | BODY MASS INDEX: 26.92 KG/M2 | SYSTOLIC BLOOD PRESSURE: 114 MMHG | DIASTOLIC BLOOD PRESSURE: 72 MMHG | WEIGHT: 188.06 LBS | TEMPERATURE: 99 F | HEART RATE: 71 BPM | OXYGEN SATURATION: 98 %

## 2019-07-18 DIAGNOSIS — Z00.00 ANNUAL PHYSICAL EXAM: ICD-10-CM

## 2019-07-18 DIAGNOSIS — E80.6 HYPERBILIRUBINEMIA: ICD-10-CM

## 2019-07-18 DIAGNOSIS — D69.6 THROMBOCYTOPENIA: ICD-10-CM

## 2019-07-18 DIAGNOSIS — R74.8 LOW SERUM HDL: ICD-10-CM

## 2019-07-18 DIAGNOSIS — Z23 NEED FOR TETANUS BOOSTER: Primary | ICD-10-CM

## 2019-07-18 DIAGNOSIS — Z12.5 SCREENING PSA (PROSTATE SPECIFIC ANTIGEN): ICD-10-CM

## 2019-07-18 DIAGNOSIS — Z87.442 HISTORY OF NEPHROLITHIASIS: ICD-10-CM

## 2019-07-18 DIAGNOSIS — Z12.11 SCREENING FOR COLON CANCER: ICD-10-CM

## 2019-07-18 PROCEDURE — 99999 PR PBB SHADOW E&M-EST. PATIENT-LVL III: CPT | Mod: PBBFAC,,, | Performed by: FAMILY MEDICINE

## 2019-07-18 PROCEDURE — 90715 TDAP VACCINE GREATER THAN OR EQUAL TO 7YO IM: ICD-10-PCS | Mod: S$GLB,,, | Performed by: FAMILY MEDICINE

## 2019-07-18 PROCEDURE — 99999 PR PBB SHADOW E&M-EST. PATIENT-LVL III: ICD-10-PCS | Mod: PBBFAC,,, | Performed by: FAMILY MEDICINE

## 2019-07-18 PROCEDURE — 90471 IMMUNIZATION ADMIN: CPT | Mod: S$GLB,,, | Performed by: FAMILY MEDICINE

## 2019-07-18 PROCEDURE — 90715 TDAP VACCINE 7 YRS/> IM: CPT | Mod: S$GLB,,, | Performed by: FAMILY MEDICINE

## 2019-07-18 PROCEDURE — 99396 PR PREVENTIVE VISIT,EST,40-64: ICD-10-PCS | Mod: 25,S$GLB,, | Performed by: FAMILY MEDICINE

## 2019-07-18 PROCEDURE — 99396 PREV VISIT EST AGE 40-64: CPT | Mod: 25,S$GLB,, | Performed by: FAMILY MEDICINE

## 2019-07-18 PROCEDURE — 90471 TDAP VACCINE GREATER THAN OR EQUAL TO 7YO IM: ICD-10-PCS | Mod: S$GLB,,, | Performed by: FAMILY MEDICINE

## 2019-07-18 NOTE — PROGRESS NOTES
Used 2 pt identifiers and reviewed allergies prior to giving T-dap injection in L/arm, VIS given then asked pt to wait 15 mins post injection for s/sx of adverse reactions.   Answers for HPI/ROS submitted by the patient on 7/16/2019   activity change: No  unexpected weight change: No  neck pain: No  hearing loss: No  rhinorrhea: No  trouble swallowing: No  eye discharge: No  visual disturbance: No  chest tightness: No  wheezing: No  chest pain: No  palpitations: No  blood in stool: No  constipation: No  vomiting: No  diarrhea: No  polydipsia: No  polyuria: No  difficulty urinating: No  urgency: No  hematuria: No  joint swelling: No  arthralgias: No  headaches: No  weakness: No  confusion: No  dysphoric mood: No

## 2019-07-18 NOTE — PROGRESS NOTES
Subjective:      Patient ID: Ja Hudson is a 49 y.o. male.    Chief Complaint: Establish Care      HPI:  Ja Hudson is a 49 year old male with history of nephrolithiasis and thrombocytopenia who presents to clinic today to establish care.    No new complaints today.    Dyslipidemia:  HDL 30 on most recent lipid panel 6/23/17.    Hx of nephrolithiasis:  CT renal stone study 10/23/16 showed mild right-sided hydronephrosis and hydroureter caused by a recently passed 2 mm stone that lies within the dependent portion of the bladder.  Denies further symptoms since that time.  Has had kidney stone 11 years prior to stone in 2016.    Hyperbilirubinemia:  Noted to 1.3 on most recent CMP 6/23/17.    Thrombocytopenia:  Platelets 148 on CBC 6/23/17.    Health Care Maintenance:  Last tetanus booster:  Will get today.  Shingles vaccination:  Deferred today.  Last routine labs:  6/23/17  Colon cancer screening:  Prefers FIT      Past Medical History:   Diagnosis Date    Kidney stone        Past Surgical History:   Procedure Laterality Date    APPENDECTOMY      KNEE SURGERY Right     OPEN REDUCTION AND INTERNAL FIXATION (ORIF) OF FRACTURE OF RADIUS Right        Family History   Problem Relation Age of Onset    Cancer Mother         breast    Hypertension Father     Diabetes Paternal Grandfather        Social History     Socioeconomic History    Marital status:      Spouse name: Not on file    Number of children: Not on file    Years of education: Not on file    Highest education level: Not on file   Occupational History    Occupation:    Social Needs    Financial resource strain: Not very hard    Food insecurity:     Worry: Never true     Inability: Never true    Transportation needs:     Medical: No     Non-medical: No   Tobacco Use    Smoking status: Passive Smoke Exposure - Never Smoker    Smokeless tobacco: Never Used    Tobacco comment: 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   Substance and Sexual Activity    Alcohol use: No     Frequency: Never     Drinks per session: Patient refused     Binge frequency: Never    Drug use: No    Sexual activity: Yes     Partners: Female     Birth control/protection: OCP   Lifestyle    Physical activity:     Days per week: 3 days     Minutes per session: 60 min    Stress: Not at all   Relationships    Social connections:     Talks on phone: Once a week     Gets together: Once a week     Attends Episcopal service: Not on file     Active member of club or organization: Yes     Attends meetings of clubs or organizations: More than 4 times per year     Relationship status:    Other Topics Concern    Not on file   Social History Narrative    , football referee, 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   HENT: Negative for congestion, hearing loss, nosebleeds, rhinorrhea, sore throat and trouble swallowing.    Eyes: Negative for pain, discharge and visual disturbance.   Respiratory: Negative for cough, chest tightness, shortness of breath and wheezing.    Cardiovascular: Negative for chest pain and palpitations.   Gastrointestinal: Negative for abdominal distention, abdominal pain, blood in stool, constipation, diarrhea, nausea and vomiting.   Endocrine: Negative for polydipsia and polyuria.   Genitourinary: Negative for difficulty urinating, dysuria, frequency, hematuria and urgency.   Musculoskeletal: Negative for arthralgias, back pain, joint swelling, myalgias and neck pain.   Skin: Negative for color change and rash.   Neurological: Negative for dizziness, syncope, speech difficulty, weakness, numbness and headaches.   Psychiatric/Behavioral: Negative for agitation, behavioral problems, confusion and dysphoric mood. The patient is not nervous/anxious.      Objective:     Vitals:    07/18/19 0956   BP:  "114/72   BP Location: Right arm   Patient Position: Sitting   BP Method: Medium (Manual)   Pulse: 71   Temp: 98.6 °F (37 °C)   TempSrc: Oral   SpO2: 98%   Weight: 85.3 kg (188 lb 0.8 oz)   Height: 5' 10" (1.778 m)       Physical Exam   Constitutional: He appears well-developed and well-nourished. He is cooperative. No distress.   HENT:   Head: Normocephalic and atraumatic.   Right Ear: Hearing and external ear normal.   Left Ear: Hearing and external ear normal.   Nose: Nose normal. No rhinorrhea. No epistaxis.   Mouth/Throat: Oropharynx is clear and moist and mucous membranes are normal. No oral lesions.   Eyes: Pupils are equal, round, and reactive to light. Conjunctivae, EOM and lids are normal. Right eye exhibits no discharge. Left eye exhibits no discharge.   Neck: Trachea normal and normal range of motion. Neck supple. No tracheal deviation present.   Cardiovascular: Normal rate, regular rhythm and normal heart sounds. Exam reveals no gallop and no friction rub.   No murmur heard.  Pulmonary/Chest: Effort normal and breath sounds normal. No respiratory distress. He has no wheezes. He has no rales.   Abdominal: Soft. Bowel sounds are normal. He exhibits no distension. There is no tenderness. There is no rebound and no guarding.   Musculoskeletal: Normal range of motion. He exhibits no edema or deformity.   Neurological: He is alert. No cranial nerve deficit. He exhibits normal muscle tone.   Skin: Skin is warm and dry. No rash noted.   Psychiatric: He has a normal mood and affect. His speech is normal and behavior is normal. Judgment and thought content normal. Cognition and memory are normal.   Nursing note and vitals reviewed.     Assessment:      1. Annual physical exam    2. History of nephrolithiasis    3. Hyperbilirubinemia    4. Low serum HDL    5. Need for tetanus booster    6. Screening for colon cancer    7. Screening PSA (prostate specific antigen)    8. Thrombocytopenia      Plan:   Ja was seen " today for establish care.    Diagnoses and all orders for this visit:    Annual physical exam  -     Lipid panel; Future  -     Comprehensive metabolic panel; Future  -     CBC auto differential; Future  -     PSA, Screening; Future    History of nephrolithiasis        -     Reviewed; recommended adequate hydration.  Return to clinic for any recurrence of symptoms.    Hyperbilirubinemia         -     Comprehensive metabolic panel; Future    Low serum HDL         -     Lipid panel; Future; recommended Mediterranean style diet--overall decreased meat consumption, substituting lean proteins like baked fish/poultry instead of red meats, overall increased vegetable consumption, and substituting healthy oils like extra virgin olive oil in the place of butters/grease.  Also recommend daily aerobic exercise.    Need for tetanus booster        -     Tdap to be administered today.    Screening for colon cancer  -     Fecal Immunochemical Test (iFOBT); Future  -     FitKit was given to patient on 7/18/2019 10:45 AM     Screening PSA (prostate specific antigen)  -     PSA, Screening; Future    Thrombocytopenia        -     CBC auto differential; Future; mild, continue to monitor

## 2019-07-19 ENCOUNTER — LAB VISIT (OUTPATIENT)
Dept: LAB | Facility: HOSPITAL | Age: 49
End: 2019-07-19
Attending: FAMILY MEDICINE
Payer: COMMERCIAL

## 2019-07-19 DIAGNOSIS — Z12.11 SCREENING FOR COLON CANCER: ICD-10-CM

## 2019-07-19 PROCEDURE — 82274 ASSAY TEST FOR BLOOD FECAL: CPT

## 2019-07-25 LAB — HEMOCCULT STL QL IA: NEGATIVE

## 2019-08-16 ENCOUNTER — PATIENT OUTREACH (OUTPATIENT)
Dept: ADMINISTRATIVE | Facility: HOSPITAL | Age: 49
End: 2019-08-16

## 2019-08-16 NOTE — PROGRESS NOTES
Pt FIT Kit has been returned & resulted. Chart review completed. Immunizations reconciled. Pt due for shingles vaccine.

## 2020-08-21 DIAGNOSIS — Z12.11 COLON CANCER SCREENING: ICD-10-CM

## 2020-10-05 ENCOUNTER — PATIENT MESSAGE (OUTPATIENT)
Dept: ADMINISTRATIVE | Facility: HOSPITAL | Age: 50
End: 2020-10-05

## 2021-01-04 ENCOUNTER — PATIENT MESSAGE (OUTPATIENT)
Dept: ADMINISTRATIVE | Facility: HOSPITAL | Age: 51
End: 2021-01-04

## 2021-04-05 ENCOUNTER — PATIENT MESSAGE (OUTPATIENT)
Dept: ADMINISTRATIVE | Facility: HOSPITAL | Age: 51
End: 2021-04-05

## 2021-04-12 ENCOUNTER — PATIENT MESSAGE (OUTPATIENT)
Dept: RESEARCH | Facility: HOSPITAL | Age: 51
End: 2021-04-12

## 2021-07-06 ENCOUNTER — PATIENT MESSAGE (OUTPATIENT)
Dept: ADMINISTRATIVE | Facility: HOSPITAL | Age: 51
End: 2021-07-06

## 2021-08-06 ENCOUNTER — OFFICE VISIT (OUTPATIENT)
Dept: INTERNAL MEDICINE | Facility: CLINIC | Age: 51
End: 2021-08-06

## 2021-08-06 ENCOUNTER — TELEPHONE (OUTPATIENT)
Dept: INTERNAL MEDICINE | Facility: CLINIC | Age: 51
End: 2021-08-06

## 2021-08-06 ENCOUNTER — LAB VISIT (OUTPATIENT)
Dept: LAB | Facility: HOSPITAL | Age: 51
End: 2021-08-06
Attending: FAMILY MEDICINE

## 2021-08-06 VITALS
WEIGHT: 189.81 LBS | SYSTOLIC BLOOD PRESSURE: 100 MMHG | BODY MASS INDEX: 27.17 KG/M2 | OXYGEN SATURATION: 97 % | HEIGHT: 70 IN | TEMPERATURE: 98 F | DIASTOLIC BLOOD PRESSURE: 80 MMHG | HEART RATE: 69 BPM

## 2021-08-06 DIAGNOSIS — Z12.11 SCREENING FOR COLON CANCER: ICD-10-CM

## 2021-08-06 DIAGNOSIS — Z12.5 SCREENING PSA (PROSTATE SPECIFIC ANTIGEN): ICD-10-CM

## 2021-08-06 DIAGNOSIS — Z00.00 ANNUAL PHYSICAL EXAM: Primary | ICD-10-CM

## 2021-08-06 DIAGNOSIS — Z00.00 ANNUAL PHYSICAL EXAM: ICD-10-CM

## 2021-08-06 DIAGNOSIS — E80.6 HYPERBILIRUBINEMIA: ICD-10-CM

## 2021-08-06 PROBLEM — D69.6 THROMBOCYTOPENIA: Status: RESOLVED | Noted: 2017-06-30 | Resolved: 2021-08-06

## 2021-08-06 LAB
25(OH)D3+25(OH)D2 SERPL-MCNC: 23 NG/ML (ref 30–96)
ALBUMIN SERPL BCP-MCNC: 4.2 G/DL (ref 3.5–5.2)
ALP SERPL-CCNC: 87 U/L (ref 55–135)
ALT SERPL W/O P-5'-P-CCNC: 10 U/L (ref 10–44)
ANION GAP SERPL CALC-SCNC: 11 MMOL/L (ref 8–16)
AST SERPL-CCNC: 17 U/L (ref 10–40)
BASOPHILS # BLD AUTO: 0.05 K/UL (ref 0–0.2)
BASOPHILS NFR BLD: 0.9 % (ref 0–1.9)
BILIRUB SERPL-MCNC: 1.6 MG/DL (ref 0.1–1)
BUN SERPL-MCNC: 8 MG/DL (ref 6–20)
CALCIUM SERPL-MCNC: 9.5 MG/DL (ref 8.7–10.5)
CHLORIDE SERPL-SCNC: 102 MMOL/L (ref 95–110)
CHOLEST SERPL-MCNC: 170 MG/DL (ref 120–199)
CHOLEST/HDLC SERPL: 5.2 {RATIO} (ref 2–5)
CO2 SERPL-SCNC: 26 MMOL/L (ref 23–29)
COMPLEXED PSA SERPL-MCNC: 0.91 NG/ML (ref 0–4)
CREAT SERPL-MCNC: 1 MG/DL (ref 0.5–1.4)
DIFFERENTIAL METHOD: ABNORMAL
EOSINOPHIL # BLD AUTO: 0 K/UL (ref 0–0.5)
EOSINOPHIL NFR BLD: 0.2 % (ref 0–8)
ERYTHROCYTE [DISTWIDTH] IN BLOOD BY AUTOMATED COUNT: 12.4 % (ref 11.5–14.5)
EST. GFR  (AFRICAN AMERICAN): >60 ML/MIN/1.73 M^2
EST. GFR  (NON AFRICAN AMERICAN): >60 ML/MIN/1.73 M^2
GLUCOSE SERPL-MCNC: 103 MG/DL (ref 70–110)
HCT VFR BLD AUTO: 48.4 % (ref 40–54)
HDLC SERPL-MCNC: 33 MG/DL (ref 40–75)
HDLC SERPL: 19.4 % (ref 20–50)
HGB BLD-MCNC: 16.3 G/DL (ref 14–18)
IMM GRANULOCYTES # BLD AUTO: 0.02 K/UL (ref 0–0.04)
IMM GRANULOCYTES NFR BLD AUTO: 0.3 % (ref 0–0.5)
LDLC SERPL CALC-MCNC: 114.8 MG/DL (ref 63–159)
LYMPHOCYTES # BLD AUTO: 1.1 K/UL (ref 1–4.8)
LYMPHOCYTES NFR BLD: 19.4 % (ref 18–48)
MCH RBC QN AUTO: 29.2 PG (ref 27–31)
MCHC RBC AUTO-ENTMCNC: 33.7 G/DL (ref 32–36)
MCV RBC AUTO: 87 FL (ref 82–98)
MONOCYTES # BLD AUTO: 0.9 K/UL (ref 0.3–1)
MONOCYTES NFR BLD: 15.2 % (ref 4–15)
NEUTROPHILS # BLD AUTO: 3.7 K/UL (ref 1.8–7.7)
NEUTROPHILS NFR BLD: 64 % (ref 38–73)
NONHDLC SERPL-MCNC: 137 MG/DL
NRBC BLD-RTO: 0 /100 WBC
PLATELET # BLD AUTO: 164 K/UL (ref 150–450)
PMV BLD AUTO: 12.3 FL (ref 9.2–12.9)
POTASSIUM SERPL-SCNC: 4.3 MMOL/L (ref 3.5–5.1)
PROT SERPL-MCNC: 7.6 G/DL (ref 6–8.4)
RBC # BLD AUTO: 5.58 M/UL (ref 4.6–6.2)
SODIUM SERPL-SCNC: 139 MMOL/L (ref 136–145)
TRIGL SERPL-MCNC: 111 MG/DL (ref 30–150)
WBC # BLD AUTO: 5.72 K/UL (ref 3.9–12.7)

## 2021-08-06 PROCEDURE — 80061 LIPID PANEL: CPT | Performed by: FAMILY MEDICINE

## 2021-08-06 PROCEDURE — 99213 OFFICE O/P EST LOW 20 MIN: CPT | Mod: PBBFAC | Performed by: FAMILY MEDICINE

## 2021-08-06 PROCEDURE — 82306 VITAMIN D 25 HYDROXY: CPT | Performed by: FAMILY MEDICINE

## 2021-08-06 PROCEDURE — 99999 PR PBB SHADOW E&M-EST. PATIENT-LVL III: CPT | Mod: PBBFAC,,, | Performed by: FAMILY MEDICINE

## 2021-08-06 PROCEDURE — 99396 PR PREVENTIVE VISIT,EST,40-64: ICD-10-PCS | Mod: S$PBB,,, | Performed by: FAMILY MEDICINE

## 2021-08-06 PROCEDURE — 80053 COMPREHEN METABOLIC PANEL: CPT | Performed by: FAMILY MEDICINE

## 2021-08-06 PROCEDURE — 84153 ASSAY OF PSA TOTAL: CPT | Performed by: FAMILY MEDICINE

## 2021-08-06 PROCEDURE — 85025 COMPLETE CBC W/AUTO DIFF WBC: CPT | Performed by: FAMILY MEDICINE

## 2021-08-06 PROCEDURE — 36415 COLL VENOUS BLD VENIPUNCTURE: CPT | Performed by: FAMILY MEDICINE

## 2021-08-06 PROCEDURE — 99999 PR PBB SHADOW E&M-EST. PATIENT-LVL III: ICD-10-PCS | Mod: PBBFAC,,, | Performed by: FAMILY MEDICINE

## 2021-08-06 PROCEDURE — 99396 PREV VISIT EST AGE 40-64: CPT | Mod: S$PBB,,, | Performed by: FAMILY MEDICINE

## 2021-08-08 ENCOUNTER — TELEPHONE (OUTPATIENT)
Dept: INTERNAL MEDICINE | Facility: CLINIC | Age: 51
End: 2021-08-08

## 2021-08-08 DIAGNOSIS — E55.9 VITAMIN D DEFICIENCY: ICD-10-CM

## 2021-08-08 RX ORDER — VIT C/E/ZN/COPPR/LUTEIN/ZEAXAN 250MG-90MG
1000 CAPSULE ORAL DAILY
Qty: 30 CAPSULE | Refills: 1 | Status: SHIPPED | OUTPATIENT
Start: 2021-08-08

## 2021-08-10 ENCOUNTER — LAB VISIT (OUTPATIENT)
Dept: LAB | Facility: HOSPITAL | Age: 51
End: 2021-08-10
Attending: FAMILY MEDICINE
Payer: COMMERCIAL

## 2021-08-10 DIAGNOSIS — Z12.11 SCREENING FOR COLON CANCER: ICD-10-CM

## 2021-08-10 PROCEDURE — 82274 ASSAY TEST FOR BLOOD FECAL: CPT | Performed by: FAMILY MEDICINE

## 2021-08-18 LAB — HEMOCCULT STL QL IA: NEGATIVE

## 2023-04-20 ENCOUNTER — OFFICE VISIT (OUTPATIENT)
Dept: PRIMARY CARE CLINIC | Facility: CLINIC | Age: 53
End: 2023-04-20
Payer: COMMERCIAL

## 2023-04-20 ENCOUNTER — LAB VISIT (OUTPATIENT)
Dept: LAB | Facility: HOSPITAL | Age: 53
End: 2023-04-20
Attending: NURSE PRACTITIONER
Payer: COMMERCIAL

## 2023-04-20 VITALS
HEART RATE: 70 BPM | BODY MASS INDEX: 27.9 KG/M2 | SYSTOLIC BLOOD PRESSURE: 118 MMHG | DIASTOLIC BLOOD PRESSURE: 80 MMHG | WEIGHT: 194.44 LBS | OXYGEN SATURATION: 96 %

## 2023-04-20 DIAGNOSIS — Z00.00 ANNUAL PHYSICAL EXAM: Primary | ICD-10-CM

## 2023-04-20 DIAGNOSIS — Z87.442 HISTORY OF NEPHROLITHIASIS: ICD-10-CM

## 2023-04-20 DIAGNOSIS — E55.9 VITAMIN D DEFICIENCY: ICD-10-CM

## 2023-04-20 DIAGNOSIS — M25.561 CHRONIC PAIN OF RIGHT KNEE: ICD-10-CM

## 2023-04-20 DIAGNOSIS — G89.29 CHRONIC PAIN OF RIGHT KNEE: ICD-10-CM

## 2023-04-20 DIAGNOSIS — Z13.6 ENCOUNTER FOR LIPID SCREENING FOR CARDIOVASCULAR DISEASE: ICD-10-CM

## 2023-04-20 DIAGNOSIS — Z00.00 ANNUAL PHYSICAL EXAM: ICD-10-CM

## 2023-04-20 DIAGNOSIS — Z11.59 ENCOUNTER FOR HEPATITIS C SCREENING TEST FOR LOW RISK PATIENT: ICD-10-CM

## 2023-04-20 DIAGNOSIS — Z13.220 ENCOUNTER FOR LIPID SCREENING FOR CARDIOVASCULAR DISEASE: ICD-10-CM

## 2023-04-20 DIAGNOSIS — Z11.4 SCREENING FOR HIV (HUMAN IMMUNODEFICIENCY VIRUS): ICD-10-CM

## 2023-04-20 DIAGNOSIS — Z12.11 SCREEN FOR COLON CANCER: ICD-10-CM

## 2023-04-20 DIAGNOSIS — E80.6 HYPERBILIRUBINEMIA: ICD-10-CM

## 2023-04-20 DIAGNOSIS — Z13.1 SCREENING FOR DIABETES MELLITUS: ICD-10-CM

## 2023-04-20 LAB
ALBUMIN SERPL BCP-MCNC: 4.2 G/DL (ref 3.5–5.2)
ALP SERPL-CCNC: 94 U/L (ref 55–135)
ALT SERPL W/O P-5'-P-CCNC: 15 U/L (ref 10–44)
ANION GAP SERPL CALC-SCNC: 9 MMOL/L (ref 8–16)
AST SERPL-CCNC: 19 U/L (ref 10–40)
BASOPHILS # BLD AUTO: 0.04 K/UL (ref 0–0.2)
BASOPHILS NFR BLD: 0.9 % (ref 0–1.9)
BILIRUB SERPL-MCNC: 1.8 MG/DL (ref 0.1–1)
BUN SERPL-MCNC: 10 MG/DL (ref 6–20)
CALCIUM SERPL-MCNC: 9.2 MG/DL (ref 8.7–10.5)
CHLORIDE SERPL-SCNC: 108 MMOL/L (ref 95–110)
CHOLEST SERPL-MCNC: 195 MG/DL (ref 120–199)
CHOLEST/HDLC SERPL: 4.9 {RATIO} (ref 2–5)
CO2 SERPL-SCNC: 25 MMOL/L (ref 23–29)
CREAT SERPL-MCNC: 0.9 MG/DL (ref 0.5–1.4)
DIFFERENTIAL METHOD: NORMAL
EOSINOPHIL # BLD AUTO: 0.1 K/UL (ref 0–0.5)
EOSINOPHIL NFR BLD: 1.1 % (ref 0–8)
ERYTHROCYTE [DISTWIDTH] IN BLOOD BY AUTOMATED COUNT: 12.7 % (ref 11.5–14.5)
EST. GFR  (NO RACE VARIABLE): >60 ML/MIN/1.73 M^2
ESTIMATED AVG GLUCOSE: 94 MG/DL (ref 68–131)
GLUCOSE SERPL-MCNC: 95 MG/DL (ref 70–110)
HBA1C MFR BLD: 4.9 % (ref 4–5.6)
HCT VFR BLD AUTO: 48.5 % (ref 40–54)
HCV AB SERPL QL IA: NORMAL
HDLC SERPL-MCNC: 40 MG/DL (ref 40–75)
HDLC SERPL: 20.5 % (ref 20–50)
HGB BLD-MCNC: 15.8 G/DL (ref 14–18)
HIV 1+2 AB+HIV1 P24 AG SERPL QL IA: NORMAL
IMM GRANULOCYTES # BLD AUTO: 0.01 K/UL (ref 0–0.04)
IMM GRANULOCYTES NFR BLD AUTO: 0.2 % (ref 0–0.5)
LDLC SERPL CALC-MCNC: 137.2 MG/DL (ref 63–159)
LYMPHOCYTES # BLD AUTO: 1.5 K/UL (ref 1–4.8)
LYMPHOCYTES NFR BLD: 33.2 % (ref 18–48)
MCH RBC QN AUTO: 29.2 PG (ref 27–31)
MCHC RBC AUTO-ENTMCNC: 32.6 G/DL (ref 32–36)
MCV RBC AUTO: 90 FL (ref 82–98)
MONOCYTES # BLD AUTO: 0.4 K/UL (ref 0.3–1)
MONOCYTES NFR BLD: 9.6 % (ref 4–15)
NEUTROPHILS # BLD AUTO: 2.5 K/UL (ref 1.8–7.7)
NEUTROPHILS NFR BLD: 55 % (ref 38–73)
NONHDLC SERPL-MCNC: 155 MG/DL
NRBC BLD-RTO: 0 /100 WBC
PLATELET # BLD AUTO: 170 K/UL (ref 150–450)
PMV BLD AUTO: 11.9 FL (ref 9.2–12.9)
POTASSIUM SERPL-SCNC: 4.5 MMOL/L (ref 3.5–5.1)
PROT SERPL-MCNC: 7.3 G/DL (ref 6–8.4)
RBC # BLD AUTO: 5.41 M/UL (ref 4.6–6.2)
SODIUM SERPL-SCNC: 142 MMOL/L (ref 136–145)
T4 FREE SERPL-MCNC: 0.99 NG/DL (ref 0.71–1.51)
TRIGL SERPL-MCNC: 89 MG/DL (ref 30–150)
TSH SERPL DL<=0.005 MIU/L-ACNC: 1.44 UIU/ML (ref 0.4–4)
WBC # BLD AUTO: 4.49 K/UL (ref 3.9–12.7)

## 2023-04-20 PROCEDURE — 86803 HEPATITIS C AB TEST: CPT | Performed by: NURSE PRACTITIONER

## 2023-04-20 PROCEDURE — 99396 PR PREVENTIVE VISIT,EST,40-64: ICD-10-PCS | Mod: S$GLB,,, | Performed by: NURSE PRACTITIONER

## 2023-04-20 PROCEDURE — 1160F RVW MEDS BY RX/DR IN RCRD: CPT | Mod: CPTII,S$GLB,, | Performed by: NURSE PRACTITIONER

## 2023-04-20 PROCEDURE — 85025 COMPLETE CBC W/AUTO DIFF WBC: CPT | Performed by: NURSE PRACTITIONER

## 2023-04-20 PROCEDURE — 3079F DIAST BP 80-89 MM HG: CPT | Mod: CPTII,S$GLB,, | Performed by: NURSE PRACTITIONER

## 2023-04-20 PROCEDURE — 99999 PR PBB SHADOW E&M-EST. PATIENT-LVL III: ICD-10-PCS | Mod: PBBFAC,,, | Performed by: NURSE PRACTITIONER

## 2023-04-20 PROCEDURE — 1160F PR REVIEW ALL MEDS BY PRESCRIBER/CLIN PHARMACIST DOCUMENTED: ICD-10-PCS | Mod: CPTII,S$GLB,, | Performed by: NURSE PRACTITIONER

## 2023-04-20 PROCEDURE — 99999 PR PBB SHADOW E&M-EST. PATIENT-LVL III: CPT | Mod: PBBFAC,,, | Performed by: NURSE PRACTITIONER

## 2023-04-20 PROCEDURE — 3008F PR BODY MASS INDEX (BMI) DOCUMENTED: ICD-10-PCS | Mod: CPTII,S$GLB,, | Performed by: NURSE PRACTITIONER

## 2023-04-20 PROCEDURE — 84439 ASSAY OF FREE THYROXINE: CPT | Performed by: NURSE PRACTITIONER

## 2023-04-20 PROCEDURE — 1159F MED LIST DOCD IN RCRD: CPT | Mod: CPTII,S$GLB,, | Performed by: NURSE PRACTITIONER

## 2023-04-20 PROCEDURE — 87389 HIV-1 AG W/HIV-1&-2 AB AG IA: CPT | Performed by: NURSE PRACTITIONER

## 2023-04-20 PROCEDURE — 3008F BODY MASS INDEX DOCD: CPT | Mod: CPTII,S$GLB,, | Performed by: NURSE PRACTITIONER

## 2023-04-20 PROCEDURE — 83036 HEMOGLOBIN GLYCOSYLATED A1C: CPT | Performed by: NURSE PRACTITIONER

## 2023-04-20 PROCEDURE — 80053 COMPREHEN METABOLIC PANEL: CPT | Performed by: NURSE PRACTITIONER

## 2023-04-20 PROCEDURE — 3079F PR MOST RECENT DIASTOLIC BLOOD PRESSURE 80-89 MM HG: ICD-10-PCS | Mod: CPTII,S$GLB,, | Performed by: NURSE PRACTITIONER

## 2023-04-20 PROCEDURE — 3074F SYST BP LT 130 MM HG: CPT | Mod: CPTII,S$GLB,, | Performed by: NURSE PRACTITIONER

## 2023-04-20 PROCEDURE — 3074F PR MOST RECENT SYSTOLIC BLOOD PRESSURE < 130 MM HG: ICD-10-PCS | Mod: CPTII,S$GLB,, | Performed by: NURSE PRACTITIONER

## 2023-04-20 PROCEDURE — 84443 ASSAY THYROID STIM HORMONE: CPT | Performed by: NURSE PRACTITIONER

## 2023-04-20 PROCEDURE — 36415 COLL VENOUS BLD VENIPUNCTURE: CPT | Performed by: NURSE PRACTITIONER

## 2023-04-20 PROCEDURE — 80061 LIPID PANEL: CPT | Performed by: NURSE PRACTITIONER

## 2023-04-20 PROCEDURE — 99396 PREV VISIT EST AGE 40-64: CPT | Mod: S$GLB,,, | Performed by: NURSE PRACTITIONER

## 2023-04-20 PROCEDURE — 1159F PR MEDICATION LIST DOCUMENTED IN MEDICAL RECORD: ICD-10-PCS | Mod: CPTII,S$GLB,, | Performed by: NURSE PRACTITIONER

## 2023-04-20 NOTE — PROGRESS NOTES
[facility] Note    Chief Complaint      Chief Complaint   Patient presents with    [facility] [place] Care    Annual Exam     History of Present Illness      Ja Hudson is a 52 y.o. male patient of Dr. Veliz with no chronic conditions known who is new to me and presents today for annual visit exam.  Patient feeling well no complaints, denies shortness of breath or chest pain, reviewed meds and history patient.  No concerns of bowel or bladder dysfunction, regular diet, stays hydrated and active-works out at gym with weights, runs, and works as a ref for games on the weekend.  Pt is a  at [employer],  with 2 girls    Comes to appointment alone.  Practices safety measure such as wearing his seatbelt.    Labs ordered  Eye exams- defer  Colonoscopy- fit kit ordered    Vaccines:  COVID- pfizer x 2  Tdap-2019  Flu shot- defer this season  Shingrix-will get at retail pharmacy      Health Maintenance   Topic Date Due    Hepatitis C Screening  Never done    Lipid Panel  08/06/2026    TETANUS VACCINE  07/18/2029       Past Medical History:   Diagnosis Date    Kidney stone     Thrombocytopenia 6/30/2017       Past Surgical History:   Procedure Laterality Date    APPENDECTOMY      FRACTURE SURGERY  Aug 2018    KNEE SURGERY Right     OPEN REDUCTION AND INTERNAL FIXATION (ORIF) OF FRACTURE OF RADIUS Right        family history includes Cancer in his mother; Diabetes in his paternal grandfather; Hypertension in his father.     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   HENT:  Negative for congestion, sinus pain and sore throat.    Eyes:  Negative for blurred vision.   Respiratory:  Negative for cough, shortness of  breath and wheezing.    Cardiovascular:  Negative for chest pain, palpitations and leg swelling.   Gastrointestinal:  Negative for abdominal pain, constipation, diarrhea, nausea and vomiting.   Genitourinary:  Negative for dysuria.   Musculoskeletal:  Negative for myalgias.   Skin:  Negative for rash.   Neurological:  Negative for dizziness, weakness and headaches.   Psychiatric/Behavioral:  Negative for depression. The patient is not nervous/anxious.       Outpatient Encounter Medications as of 4/20/2023   Medication Sig Dispense Refill    cholecalciferol, vitamin D3, (VITAMIN D3) 25 mcg (1,000 unit) capsule Take 1 capsule (1,000 Units total) by mouth once daily. 30 capsule 1     No facility-administered encounter medications on file as of 4/20/2023.       Review of patient's allergies indicates:   Allergen Reactions    Keflex [cephalexin] Swelling and Rash    Statins-hmg-coa reductase inhibitors Swelling and Rash       Physical Exam      Vital Signs  Pulse: 70  SpO2: 96 %  BP: 118/80  Height and Weight  Weight: 88.2 kg (194 lb 7.1 oz)    Physical Exam  Vitals and nursing note reviewed.   Constitutional:       General: He is not in acute distress.     Appearance: Normal appearance. He is well-developed. He is not ill-appearing.   HENT:      Head: Normocephalic and atraumatic.      Right Ear: External ear normal.      Left Ear: External ear normal.   Eyes:      Conjunctiva/sclera: Conjunctivae normal.      Pupils: Pupils are equal, round, and reactive to light.   Neck:      Thyroid: No thyromegaly.      Vascular: No JVD.      Trachea: No tracheal deviation.   Cardiovascular:      Rate and Rhythm: Normal rate and regular rhythm.      Heart sounds: Normal heart sounds.   Pulmonary:      Effort: Pulmonary effort is normal. No respiratory distress.      Breath sounds: Normal breath sounds.   Abdominal:      General: Bowel sounds are normal. There is no distension.      Palpations: Abdomen is soft.      Tenderness: There  is no abdominal tenderness.   Musculoskeletal:         General: Normal range of motion.      Cervical back: Normal range of motion and neck supple.   Lymphadenopathy:      Cervical: No cervical adenopathy.   Skin:     General: Skin is warm and dry.      Coloration: Skin is not pale.      Findings: No erythema or rash.   Neurological:      General: No focal deficit present.      Mental Status: He is alert and oriented to person, place, and time.   Psychiatric:         Mood and Affect: Mood normal.         Behavior: Behavior normal.         Thought Content: Thought content normal.         Judgment: Judgment normal.        Laboratory:  CBC:  Lab Results   Component Value Date    WBC 5.72 08/06/2021    RBC 5.58 08/06/2021    HGB 16.3 08/06/2021    HCT 48.4 08/06/2021     08/06/2021    MCV 87 08/06/2021    MCH 29.2 08/06/2021    MCHC 33.7 08/06/2021    MCHC 33.9 07/18/2019    MCHC 34.4 06/23/2017     CMP:  Lab Results   Component Value Date     08/06/2021    CALCIUM 9.5 08/06/2021    ALBUMIN 4.2 08/06/2021    PROT 7.6 08/06/2021     08/06/2021    K 4.3 08/06/2021    CO2 26 08/06/2021     08/06/2021    BUN 8 08/06/2021    ALKPHOS 87 08/06/2021    ALT 10 08/06/2021    AST 17 08/06/2021    BILITOT 1.6 (H) 08/06/2021    BILITOT 1.7 (H) 07/18/2019    BILITOT 1.3 (H) 06/23/2017     URINALYSIS:  Lab Results   Component Value Date    COLORU Yellow 10/23/2016    SPECGRAV 1.020 10/23/2016    PHUR 7.0 10/23/2016    PROTEINUA Trace (A) 10/23/2016    BACTERIA Rare 10/23/2016    NITRITE Negative 10/23/2016    LEUKOCYTESUR Negative 10/23/2016    UROBILINOGEN Negative 10/23/2016    HYALINECASTS 1 10/23/2016      LIPIDS:  Lab Results   Component Value Date    TSH 3.0 05/18/2006    HDL 33 (L) 08/06/2021    HDL 36 (L) 07/18/2019    HDL 30 (L) 06/23/2017    CHOL 170 08/06/2021    CHOL 167 07/18/2019    CHOL 143 06/23/2017    TRIG 111 08/06/2021    TRIG 135 07/18/2019    TRIG 111 06/23/2017    LDLCALC 114.8  08/06/2021    LDLCALC 104.0 07/18/2019    LDLCALC 90.8 06/23/2017    CHOLHDL 19.4 (L) 08/06/2021    CHOLHDL 21.6 07/18/2019    CHOLHDL 21.0 06/23/2017    NONHDLCHOL 137 08/06/2021    NONHDLCHOL 131 07/18/2019    NONHDLCHOL 113 06/23/2017    TOTALCHOLEST 5.2 (H) 08/06/2021    TOTALCHOLEST 4.6 07/18/2019    TOTALCHOLEST 4.8 06/23/2017     TSH:  Lab Results   Component Value Date    TSH 3.0 05/18/2006     A1C:  No results found for: HGBA1C      Assessment/Plan     Ja Hudson is a 52 y.o.male with:    Annual physical exam  -     CBC Auto Differential; Future; Expected date: 04/20/2023  -     Comprehensive Metabolic Panel; Future; Expected date: 04/20/2023  -     Hemoglobin A1C; Future; Expected date: 04/20/2023  -     HIV 1/2 Ag/Ab (4th Gen); Future; Expected date: 04/20/2023  -     Lipid Panel; Future; Expected date: 04/20/2023  -     T4, Free; Future; Expected date: 04/20/2023  -     TSH; Future; Expected date: 04/20/2023  -     Hepatitis C Antibody; Future; Expected date: 04/20/2023    History of nephrolithiasis  -     CBC Auto Differential; Future; Expected date: 04/20/2023  -     Comprehensive Metabolic Panel; Future; Expected date: 04/20/2023  -     Hemoglobin A1C; Future; Expected date: 04/20/2023  -     HIV 1/2 Ag/Ab (4th Gen); Future; Expected date: 04/20/2023  -     Lipid Panel; Future; Expected date: 04/20/2023  -     T4, Free; Future; Expected date: 04/20/2023  -     TSH; Future; Expected date: 04/20/2023  -     Hepatitis C Antibody; Future; Expected date: 04/20/2023    Vitamin D deficiency    Hyperbilirubinemia  -     CBC Auto Differential; Future; Expected date: 04/20/2023  -     Comprehensive Metabolic Panel; Future; Expected date: 04/20/2023  -     Hemoglobin A1C; Future; Expected date: 04/20/2023  -     HIV 1/2 Ag/Ab (4th Gen); Future; Expected date: 04/20/2023  -     Lipid Panel; Future; Expected date: 04/20/2023  -     T4, Free; Future; Expected date: 04/20/2023  -     TSH; Future; Expected  date: 04/20/2023  -     Hepatitis C Antibody; Future; Expected date: 04/20/2023    Screening for HIV (human immunodeficiency virus)  -     CBC Auto Differential; Future; Expected date: 04/20/2023  -     Comprehensive Metabolic Panel; Future; Expected date: 04/20/2023  -     Hemoglobin A1C; Future; Expected date: 04/20/2023  -     HIV 1/2 Ag/Ab (4th Gen); Future; Expected date: 04/20/2023  -     Lipid Panel; Future; Expected date: 04/20/2023  -     T4, Free; Future; Expected date: 04/20/2023  -     TSH; Future; Expected date: 04/20/2023  -     Hepatitis C Antibody; Future; Expected date: 04/20/2023    Encounter for lipid screening for cardiovascular disease  -     CBC Auto Differential; Future; Expected date: 04/20/2023  -     Comprehensive Metabolic Panel; Future; Expected date: 04/20/2023  -     Hemoglobin A1C; Future; Expected date: 04/20/2023  -     HIV 1/2 Ag/Ab (4th Gen); Future; Expected date: 04/20/2023  -     Lipid Panel; Future; Expected date: 04/20/2023  -     T4, Free; Future; Expected date: 04/20/2023  -     TSH; Future; Expected date: 04/20/2023  -     Hepatitis C Antibody; Future; Expected date: 04/20/2023    Screening for diabetes mellitus  -     CBC Auto Differential; Future; Expected date: 04/20/2023  -     Comprehensive Metabolic Panel; Future; Expected date: 04/20/2023  -     Hemoglobin A1C; Future; Expected date: 04/20/2023  -     HIV 1/2 Ag/Ab (4th Gen); Future; Expected date: 04/20/2023  -     Lipid Panel; Future; Expected date: 04/20/2023  -     T4, Free; Future; Expected date: 04/20/2023  -     TSH; Future; Expected date: 04/20/2023  -     Hepatitis C Antibody; Future; Expected date: 04/20/2023    Encounter for hepatitis C screening test for low risk patient  -     CBC Auto Differential; Future; Expected date: 04/20/2023  -     Comprehensive Metabolic Panel; Future; Expected date: 04/20/2023  -     Hemoglobin A1C; Future; Expected date: 04/20/2023  -     HIV 1/2 Ag/Ab (4th Gen); Future;  Expected date: 04/20/2023  -     Lipid Panel; Future; Expected date: 04/20/2023  -     T4, Free; Future; Expected date: 04/20/2023  -     TSH; Future; Expected date: 04/20/2023  -     Hepatitis C Antibody; Future; Expected date: 04/20/2023    Screen for colon cancer  -     Fecal Immunochemical Test (iFOBT); Future; Expected date: 04/20/2023    Chronic pain of right knee  Will need to refer to cardiology or PT at some point for h/o cartilage and ligament removal to right knee w/ recurrent tendonitis.        Health Maintenance Due   Topic Date Due    Hepatitis C Screening  Never done    Hemoglobin A1c (Diabetic Prevention Screening)  Never done    Shingles Vaccine (1 of 2) Never done    COVID-19 Vaccine (3 - Booster for Pfizer series) 09/29/2021    Colorectal Cancer Screening  08/18/2022          I spent 42 minutes on the day of this encounter for preparing for, evaluating, treating, and managing this patient.        -Continue current medications and maintain follow up with specialists.  Return to clinic in 1 year or sooner for any concerns  No follow-ups on file.      Mandi Falcon NP-C  [facility]

## 2023-04-24 ENCOUNTER — LAB VISIT (OUTPATIENT)
Dept: LAB | Facility: HOSPITAL | Age: 53
End: 2023-04-24
Payer: COMMERCIAL

## 2023-04-24 DIAGNOSIS — Z12.11 SCREEN FOR COLON CANCER: ICD-10-CM

## 2023-04-24 PROCEDURE — 82274 ASSAY TEST FOR BLOOD FECAL: CPT | Performed by: NURSE PRACTITIONER

## 2023-05-02 LAB — HEMOCCULT STL QL IA: NEGATIVE

## 2024-04-02 ENCOUNTER — OCCUPATIONAL HEALTH (OUTPATIENT)
Dept: URGENT CARE | Facility: CLINIC | Age: 54
End: 2024-04-02

## 2024-04-02 DIAGNOSIS — Z02.1 PRE-EMPLOYMENT EXAMINATION: Primary | ICD-10-CM

## 2024-04-02 PROCEDURE — 99499 UNLISTED E&M SERVICE: CPT | Mod: S$GLB,,, | Performed by: NURSE PRACTITIONER

## 2024-04-02 PROCEDURE — 99199 UNLISTED SPECIAL SVC PX/RPRT: CPT | Mod: S$GLB,,, | Performed by: NURSE PRACTITIONER

## 2024-04-02 PROCEDURE — 80305 DRUG TEST PRSMV DIR OPT OBS: CPT | Mod: S$GLB,,, | Performed by: NURSE PRACTITIONER
